# Patient Record
Sex: MALE | Race: WHITE | NOT HISPANIC OR LATINO | Employment: FULL TIME | URBAN - METROPOLITAN AREA
[De-identification: names, ages, dates, MRNs, and addresses within clinical notes are randomized per-mention and may not be internally consistent; named-entity substitution may affect disease eponyms.]

---

## 2020-06-10 ENCOUNTER — TELEPHONE (OUTPATIENT)
Dept: URGENT CARE | Facility: CLINIC | Age: 30
End: 2020-06-10

## 2020-06-10 ENCOUNTER — OFFICE VISIT (OUTPATIENT)
Dept: URGENT CARE | Facility: CLINIC | Age: 30
End: 2020-06-10
Payer: OTHER GOVERNMENT

## 2020-06-10 DIAGNOSIS — Z03.818 ENCOUNTER FOR OBSERVATION FOR SUSPECTED EXPOSURE TO OTHER BIOLOGICAL AGENTS RULED OUT: ICD-10-CM

## 2020-06-10 LAB — SARS-COV-2 IGG SERPLBLD QL IA.RAPID: NEGATIVE

## 2020-06-10 PROCEDURE — 86769 SARS-COV-2 COVID-19 ANTIBODY: CPT

## 2020-06-10 PROCEDURE — U0003 INFECTIOUS AGENT DETECTION BY NUCLEIC ACID (DNA OR RNA); SEVERE ACUTE RESPIRATORY SYNDROME CORONAVIRUS 2 (SARS-COV-2) (CORONAVIRUS DISEASE [COVID-19]), AMPLIFIED PROBE TECHNIQUE, MAKING USE OF HIGH THROUGHPUT TECHNOLOGIES AS DESCRIBED BY CMS-2020-01-R: HCPCS

## 2020-06-10 NOTE — PROGRESS NOTES
Patient was exposed at work.  He is here to be tested.  Asymptomatic.  Counseled patient and answered questions in regards to COVID-19 testing and diagnosis.

## 2020-06-10 NOTE — TELEPHONE ENCOUNTER
Pt was informed of his negative results, verbalized understanding, and had no further questions.  ----- Message from Danial Conti PA-C sent at 6/10/2020  2:54 PM CDT -----  COVID-19 negative. Please follow up with PCP as needed.

## 2020-06-11 LAB — SARS-COV-2 RNA RESP QL NAA+PROBE: NOT DETECTED

## 2020-07-06 ENCOUNTER — OCCUPATIONAL HEALTH (OUTPATIENT)
Dept: URGENT CARE | Facility: CLINIC | Age: 30
End: 2020-07-06
Payer: OTHER GOVERNMENT

## 2020-07-06 VITALS
HEIGHT: 71 IN | BODY MASS INDEX: 25.2 KG/M2 | OXYGEN SATURATION: 98 % | WEIGHT: 180 LBS | HEART RATE: 73 BPM | TEMPERATURE: 99 F

## 2020-07-06 DIAGNOSIS — Z03.818 ENCOUNTER FOR OBSERVATION FOR SUSPECTED EXPOSURE TO OTHER BIOLOGICAL AGENTS RULED OUT: ICD-10-CM

## 2020-07-06 PROCEDURE — U0003 INFECTIOUS AGENT DETECTION BY NUCLEIC ACID (DNA OR RNA); SEVERE ACUTE RESPIRATORY SYNDROME CORONAVIRUS 2 (SARS-COV-2) (CORONAVIRUS DISEASE [COVID-19]), AMPLIFIED PROBE TECHNIQUE, MAKING USE OF HIGH THROUGHPUT TECHNOLOGIES AS DESCRIBED BY CMS-2020-01-R: HCPCS

## 2020-07-06 NOTE — PROGRESS NOTES
Subjective:       Patient ID: Johnny Heller is a 29 y.o. male.    Chief Complaint: No chief complaint on file.    Pt had a headace , fatigue and nausea early this morning. His employer is  Requesting his to get tested for covid.      Constitution: Negative for chills, fatigue and fever.   HENT: Negative for congestion and sore throat.    Neck: Negative for painful lymph nodes.   Cardiovascular: Negative for chest pain and leg swelling.   Eyes: Negative for double vision and blurred vision.   Respiratory: Negative for cough and shortness of breath.    Gastrointestinal: Negative for nausea, vomiting and diarrhea.   Genitourinary: Negative for dysuria, frequency and urgency.   Musculoskeletal: Negative for joint pain, joint swelling, muscle cramps and muscle ache.   Skin: Negative for color change, pale and rash.   Allergic/Immunologic: Negative for seasonal allergies.   Neurological: Negative for dizziness, history of vertigo, light-headedness, passing out and headaches.   Hematologic/Lymphatic: Negative for swollen lymph nodes, easy bruising/bleeding and history of blood clots. Does not bruise/bleed easily.   Psychiatric/Behavioral: Negative for nervous/anxious, sleep disturbance and depression. The patient is not nervous/anxious.         Objective:      Physical Exam    Assessment:       No diagnosis found.    Plan:                   No follow-ups on file.

## 2020-07-09 ENCOUNTER — TELEPHONE (OUTPATIENT)
Dept: URGENT CARE | Facility: CLINIC | Age: 30
End: 2020-07-09

## 2020-07-09 LAB — SARS-COV-2 RNA RESP QL NAA+PROBE: NOT DETECTED

## 2020-10-10 ENCOUNTER — HOSPITAL ENCOUNTER (EMERGENCY)
Facility: HOSPITAL | Age: 30
Discharge: HOME | End: 2020-10-10
Attending: EMERGENCY MEDICINE
Payer: OTHER GOVERNMENT

## 2020-10-10 VITALS
HEART RATE: 91 BPM | SYSTOLIC BLOOD PRESSURE: 129 MMHG | BODY MASS INDEX: 26.6 KG/M2 | RESPIRATION RATE: 18 BRPM | HEIGHT: 71 IN | DIASTOLIC BLOOD PRESSURE: 91 MMHG | TEMPERATURE: 100 F | WEIGHT: 190 LBS | OXYGEN SATURATION: 97 %

## 2020-10-10 DIAGNOSIS — R05.9 COUGH: ICD-10-CM

## 2020-10-10 DIAGNOSIS — R07.89 CHEST TIGHTNESS: ICD-10-CM

## 2020-10-10 DIAGNOSIS — J06.9 VIRAL URI WITH COUGH: Primary | ICD-10-CM

## 2020-10-10 LAB
ALBUMIN SERPL BCP-MCNC: 5 G/DL (ref 3.5–5.2)
ALP SERPL-CCNC: 76 U/L (ref 38–126)
ALT SERPL W/O P-5'-P-CCNC: 46 U/L (ref 10–44)
ANION GAP SERPL CALC-SCNC: 9 MMOL/L (ref 8–16)
AST SERPL-CCNC: 37 U/L (ref 15–46)
BASOPHILS # BLD AUTO: 0.03 K/UL (ref 0–0.2)
BASOPHILS NFR BLD: 0.3 % (ref 0–1.9)
BILIRUB SERPL-MCNC: 0.6 MG/DL (ref 0.1–1)
BUN SERPL-MCNC: 9 MG/DL (ref 2–20)
CALCIUM SERPL-MCNC: 10.1 MG/DL (ref 8.7–10.5)
CHLORIDE SERPL-SCNC: 102 MMOL/L (ref 95–110)
CO2 SERPL-SCNC: 31 MMOL/L (ref 23–29)
CREAT SERPL-MCNC: 0.83 MG/DL (ref 0.5–1.4)
DIFFERENTIAL METHOD: ABNORMAL
EOSINOPHIL # BLD AUTO: 0 K/UL (ref 0–0.5)
EOSINOPHIL NFR BLD: 0.3 % (ref 0–8)
ERYTHROCYTE [DISTWIDTH] IN BLOOD BY AUTOMATED COUNT: 13.2 % (ref 11.5–14.5)
EST. GFR  (AFRICAN AMERICAN): >60 ML/MIN/1.73 M^2
EST. GFR  (NON AFRICAN AMERICAN): >60 ML/MIN/1.73 M^2
GLUCOSE SERPL-MCNC: 97 MG/DL (ref 70–110)
GROUP A STREP, MOLECULAR: NEGATIVE
HCT VFR BLD AUTO: 51.9 % (ref 40–54)
HGB BLD-MCNC: 18.1 G/DL (ref 14–18)
IMM GRANULOCYTES # BLD AUTO: 0.03 K/UL (ref 0–0.04)
IMM GRANULOCYTES NFR BLD AUTO: 0.3 % (ref 0–0.5)
INFLUENZA A, MOLECULAR: NEGATIVE
INFLUENZA B, MOLECULAR: NEGATIVE
LYMPHOCYTES # BLD AUTO: 0.8 K/UL (ref 1–4.8)
LYMPHOCYTES NFR BLD: 8.1 % (ref 18–48)
MCH RBC QN AUTO: 33 PG (ref 27–31)
MCHC RBC AUTO-ENTMCNC: 34.9 G/DL (ref 32–36)
MCV RBC AUTO: 95 FL (ref 82–98)
MONOCYTES # BLD AUTO: 0.9 K/UL (ref 0.3–1)
MONOCYTES NFR BLD: 9 % (ref 4–15)
NEUTROPHILS # BLD AUTO: 8.2 K/UL (ref 1.8–7.7)
NEUTROPHILS NFR BLD: 82 % (ref 38–73)
NRBC BLD-RTO: 0 /100 WBC
NT-PROBNP: 25 PG/ML (ref 5–450)
PLATELET # BLD AUTO: 189 K/UL (ref 150–350)
PMV BLD AUTO: 9.9 FL (ref 9.2–12.9)
POTASSIUM SERPL-SCNC: 4.6 MMOL/L (ref 3.5–5.1)
PROT SERPL-MCNC: 8.6 G/DL (ref 6–8.4)
RBC # BLD AUTO: 5.48 M/UL (ref 4.6–6.2)
SARS-COV-2 RDRP RESP QL NAA+PROBE: NEGATIVE
SODIUM SERPL-SCNC: 142 MMOL/L (ref 136–145)
SPECIMEN SOURCE: NORMAL
TROPONIN I SERPL DL<=0.01 NG/ML-MCNC: <0.012 NG/ML (ref 0.01–0.03)
WBC # BLD AUTO: 10 K/UL (ref 3.9–12.7)

## 2020-10-10 PROCEDURE — 84484 ASSAY OF TROPONIN QUANT: CPT | Mod: ER

## 2020-10-10 PROCEDURE — 85025 COMPLETE CBC W/AUTO DIFF WBC: CPT | Mod: ER

## 2020-10-10 PROCEDURE — 93010 EKG 12-LEAD: ICD-10-PCS | Mod: ,,, | Performed by: INTERNAL MEDICINE

## 2020-10-10 PROCEDURE — 83880 ASSAY OF NATRIURETIC PEPTIDE: CPT | Mod: ER

## 2020-10-10 PROCEDURE — U0002 COVID-19 LAB TEST NON-CDC: HCPCS | Mod: ER

## 2020-10-10 PROCEDURE — 99285 EMERGENCY DEPT VISIT HI MDM: CPT | Mod: 25,ER

## 2020-10-10 PROCEDURE — 93010 ELECTROCARDIOGRAM REPORT: CPT | Mod: ,,, | Performed by: INTERNAL MEDICINE

## 2020-10-10 PROCEDURE — 87651 STREP A DNA AMP PROBE: CPT | Mod: ER

## 2020-10-10 PROCEDURE — 80053 COMPREHEN METABOLIC PANEL: CPT | Mod: ER

## 2020-10-10 PROCEDURE — 87502 INFLUENZA DNA AMP PROBE: CPT | Mod: ER

## 2020-10-10 PROCEDURE — 93005 ELECTROCARDIOGRAM TRACING: CPT | Mod: ER

## 2020-10-10 RX ORDER — ESCITALOPRAM OXALATE 5 MG/1
5 TABLET ORAL DAILY
COMMUNITY

## 2020-10-10 RX ORDER — BENZONATATE 100 MG/1
100 CAPSULE ORAL 3 TIMES DAILY PRN
Qty: 10 CAPSULE | Refills: 0 | Status: SHIPPED | OUTPATIENT
Start: 2020-10-10 | End: 2020-10-20

## 2020-10-10 NOTE — ED PROVIDER NOTES
Encounter Date: 10/10/2020       History     Chief Complaint   Patient presents with    Cough     c/o cough and congestion for several days. Reports headache that started today. States chest burns with coughing.     Nasal Congestion     29-year-old male presents to the emergency department for evaluation of 4 day history of cough, nasal congestion, chest pain and intermittent palpitations.  He reports that the symptoms began gradually 4 days ago and have been intermittent since onset.  He denies any fever, headache, dizziness, shortness of breath, abdominal pain, nausea, vomiting, flank pain or dysuria.  He denies any known sick contacts.  He does report that the intermittent chest pain and palpitations only happens with coughing.  He reports that the cough is intermittently productive with white discharge.  He does report mild sore throat.  He reports that he went to an urgent care and they informed him to come here secondary to abnormal heart rate.  No treatment was attempted prior to arrival.  He denies any leg swelling, recent surgery or recent travel.        Review of patient's allergies indicates:  No Known Allergies  History reviewed. No pertinent past medical history.  History reviewed. No pertinent surgical history.  History reviewed. No pertinent family history.  Social History     Tobacco Use    Smoking status: Current Every Day Smoker     Packs/day: 0.25     Types: Cigarettes    Smokeless tobacco: Never Used   Substance Use Topics    Alcohol use: Yes     Comment: socially    Drug use: Never     Review of Systems   Constitutional: Negative for activity change, appetite change and fever.   HENT: Positive for congestion and sore throat. Negative for ear discharge, ear pain, rhinorrhea, sinus pressure, trouble swallowing and voice change.    Eyes: Negative for photophobia and visual disturbance.   Respiratory: Positive for cough. Negative for chest tightness, shortness of breath and wheezing.     Cardiovascular: Positive for chest pain and palpitations. Negative for leg swelling.   Gastrointestinal: Negative for abdominal pain, constipation, diarrhea, nausea and vomiting.   Genitourinary: Negative for decreased urine volume, dysuria, flank pain and hematuria.   Musculoskeletal: Negative for back pain.   Skin: Negative for rash.   Neurological: Negative for dizziness, syncope, weakness, light-headedness, numbness and headaches.   Hematological: Does not bruise/bleed easily.       Physical Exam     Initial Vitals [10/10/20 1446]   BP Pulse Resp Temp SpO2   (!) 156/85 93 18 99.5 °F (37.5 °C) 96 %      MAP       --         Physical Exam    Nursing note and vitals reviewed.  Constitutional: He appears well-developed and well-nourished. He is not diaphoretic. No distress.   HENT:   Head: Normocephalic and atraumatic.   Right Ear: External ear normal.   Left Ear: External ear normal.   Mouth/Throat: Oropharynx is clear and moist. No oropharyngeal exudate.   Eyes: Conjunctivae and EOM are normal. Pupils are equal, round, and reactive to light.   Neck: Normal range of motion. Neck supple.   Cardiovascular: Normal rate, regular rhythm and normal heart sounds.   Pulmonary/Chest: Breath sounds normal. No respiratory distress. He has no wheezes. He has no rhonchi. He has no rales. He exhibits no tenderness.   Abdominal: Soft. Bowel sounds are normal. He exhibits no distension. There is no abdominal tenderness. There is no guarding.   Lymphadenopathy:     He has no cervical adenopathy.   Neurological: He is alert and oriented to person, place, and time.   Skin: Skin is warm and dry.   Psychiatric: He has a normal mood and affect.         ED Course   Procedures  Labs Reviewed   CBC W/ AUTO DIFFERENTIAL - Abnormal; Notable for the following components:       Result Value    Hemoglobin 18.1 (*)     Mean Corpuscular Hemoglobin 33.0 (*)     Gran # (ANC) 8.2 (*)     Lymph # 0.8 (*)     Gran% 82.0 (*)     Lymph% 8.1 (*)      All other components within normal limits   COMPREHENSIVE METABOLIC PANEL - Abnormal; Notable for the following components:    CO2 31 (*)     Total Protein 8.6 (*)     ALT 46 (*)     All other components within normal limits   GROUP A STREP, MOLECULAR   INFLUENZA A & B BY MOLECULAR   TROPONIN I   NT-PRO NATRIURETIC PEPTIDE   SARS-COV-2 RNA AMPLIFICATION, QUAL     EKG Readings: (Independently Interpreted)   Initial Reading: No STEMI. Rhythm: Sinus rhythm with premature atrial complexes. Heart Rate: 96.       Imaging Results          X-Ray Chest AP Portable (Final result)  Result time 10/10/20 15:55:23    Final result by Bear Marx MD (10/10/20 15:55:23)                 Impression:      1.  Negative for acute process involving the chest.    2.  Incidental findings as noted above.      Electronically signed by: Bear Marx MD  Date:    10/10/2020  Time:    15:55             Narrative:    EXAMINATION:  XR CHEST AP PORTABLE    CLINICAL HISTORY:  Cough    COMPARISON:  No comparison studies are available.    FINDINGS:  The study is lordotic in position and rotated to the right.  The lungs are clear. The cardiac silhouette size is normal. The trachea is midline and the mediastinal width is normal. Negative for focal infiltrate, effusion or pneumothorax. Pulmonary vasculature is normal. Negative for osseous abnormalities. Tortuous aorta.  Marginal spondylosis with convex-left curvature.                                 Medical Decision Making:   Initial Assessment:   29-year-old male presents to the emergency department for evaluation of cough, intermittent chest pain, intermittent palpitations, and nasal congestion.  Physical exam reveals a nontoxic-appearing male in no acute distress.  Patient is afebrile vital signs within normal limits.  Neurological exam reveals an alert and oriented patient.  TMs reveal no erythema.  Posterior pharynx reveals no erythema, edema or tonsillar exudate.  Neck is supple, no meningeal  signs noted.  Lungs clear to auscultation bilaterally.  No respiratory distress or accessory muscle use noted.  Abdominal exam reveals soft abdomen, nontender palpation.  No peripheral edema noted.  Differential Diagnosis:   Chest x-ray ordered to assess possible pneumonia or consolidation  Influenza  COVID-19  Streptococcal pharyngitis  Viral URI  ACS  CHF  Dehydration  ED Management:  EKG reveals no acute ST changes.  Rapid COVID negative.  Group a strep negative.  Influenza negative.  Chest x-ray report reveals no acute findings.  Incidental finding of tortuous aorta noted.  These findings were discussed at length with the patient verbalizes understanding and agreement course of treatment.  Patient verbalized understanding and agreement course of treatment.  Patient denies any current chest pain.  Instructed the patient to follow up with his primary care provider for re-evaluation and to return to the emergency department immediately for any new or worsening symptoms.  I discussed this patient at length with Dr. Calderon who is in agreement course of treatment.                   ED Course as of Oct 10 1646   Sat Oct 10, 2020   1505 EKG shows normal sinus rhythm rate of 96 beats per minute with no ST elevation MI.  Normal QTC    [MB]      ED Course User Index  [MB] Jefferson Calderon MD            Clinical Impression:       ICD-10-CM ICD-9-CM   1. Viral URI with cough  J06.9 465.9   2. Chest tightness  R07.89 786.59   3. Cough  R05 786.2                          ED Disposition Condition    Discharge Stable        ED Prescriptions     None        Follow-up Information    None                                      Isabel Tsang PA-C  10/10/20 0735

## 2020-10-10 NOTE — DISCHARGE INSTRUCTIONS
Your chest x-ray did not reveal any evidence of pneumonia or consolidation.  Your influenza test, COVID-19 test and strep test were all negative.  This is likely an upper respiratory infection of viral etiology.  Symptomatic treatment advised including plenty of clear fluid.  You are instructed to follow-up with your primary care provider for re-evaluation and to return to the emergency department immediately for any new or worsening symptoms.

## 2022-06-02 ENCOUNTER — HOSPITAL ENCOUNTER (EMERGENCY)
Facility: HOSPITAL | Age: 32
Discharge: HOME | End: 2022-06-02
Attending: EMERGENCY MEDICINE

## 2022-06-02 VITALS
OXYGEN SATURATION: 97 % | SYSTOLIC BLOOD PRESSURE: 168 MMHG | BODY MASS INDEX: 25.9 KG/M2 | WEIGHT: 185 LBS | HEART RATE: 69 BPM | TEMPERATURE: 98 F | HEIGHT: 71 IN | RESPIRATION RATE: 16 BRPM | DIASTOLIC BLOOD PRESSURE: 105 MMHG

## 2022-06-02 DIAGNOSIS — S01.511A LIP LACERATION, INITIAL ENCOUNTER: Primary | ICD-10-CM

## 2022-06-02 LAB
HCV AB SERPL QL IA: NEGATIVE
HIV 1+2 AB+HIV1 P24 AG SERPL QL IA: NEGATIVE

## 2022-06-02 PROCEDURE — 90715 TDAP VACCINE 7 YRS/> IM: CPT | Performed by: STUDENT IN AN ORGANIZED HEALTH CARE EDUCATION/TRAINING PROGRAM

## 2022-06-02 PROCEDURE — 99284 EMERGENCY DEPT VISIT MOD MDM: CPT | Mod: 25,,, | Performed by: EMERGENCY MEDICINE

## 2022-06-02 PROCEDURE — 86803 HEPATITIS C AB TEST: CPT | Performed by: EMERGENCY MEDICINE

## 2022-06-02 PROCEDURE — 99284 EMERGENCY DEPT VISIT MOD MDM: CPT | Mod: 25

## 2022-06-02 PROCEDURE — 90471 IMMUNIZATION ADMIN: CPT | Performed by: STUDENT IN AN ORGANIZED HEALTH CARE EDUCATION/TRAINING PROGRAM

## 2022-06-02 PROCEDURE — 99284 PR EMERGENCY DEPT VISIT,LEVEL IV: ICD-10-PCS | Mod: 25,,, | Performed by: EMERGENCY MEDICINE

## 2022-06-02 PROCEDURE — 12013 PR RESUPERF WND FACE 2.6-5 CM: ICD-10-PCS | Mod: ,,, | Performed by: EMERGENCY MEDICINE

## 2022-06-02 PROCEDURE — 12013 RPR F/E/E/N/L/M 2.6-5.0 CM: CPT

## 2022-06-02 PROCEDURE — 63600175 PHARM REV CODE 636 W HCPCS: Performed by: STUDENT IN AN ORGANIZED HEALTH CARE EDUCATION/TRAINING PROGRAM

## 2022-06-02 PROCEDURE — 87389 HIV-1 AG W/HIV-1&-2 AB AG IA: CPT | Performed by: EMERGENCY MEDICINE

## 2022-06-02 PROCEDURE — 12013 RPR F/E/E/N/L/M 2.6-5.0 CM: CPT | Mod: ,,, | Performed by: EMERGENCY MEDICINE

## 2022-06-02 PROCEDURE — 25000003 PHARM REV CODE 250: Performed by: STUDENT IN AN ORGANIZED HEALTH CARE EDUCATION/TRAINING PROGRAM

## 2022-06-02 RX ORDER — LIDOCAINE HYDROCHLORIDE 10 MG/ML
10 INJECTION INFILTRATION; PERINEURAL
Status: COMPLETED | OUTPATIENT
Start: 2022-06-02 | End: 2022-06-02

## 2022-06-02 RX ADMIN — TETANUS TOXOID, REDUCED DIPHTHERIA TOXOID AND ACELLULAR PERTUSSIS VACCINE, ADSORBED 0.5 ML: 5; 2.5; 8; 8; 2.5 SUSPENSION INTRAMUSCULAR at 08:06

## 2022-06-02 RX ADMIN — LIDOCAINE HYDROCHLORIDE 10 ML: 10 INJECTION, SOLUTION INFILTRATION; PERINEURAL at 08:06

## 2022-06-02 NOTE — DISCHARGE INSTRUCTIONS
You have been evaluated in the Emergency Department today for a lip laceration. Your laceration was repaired by dissolvable sutures.    Please follow up with your primary care physician within 3 days for a wound check.    Go to the Emergency Department if you experience worsening symptoms, failure to improve, or have any questions, concerns, or new or concerning symptoms such as chest pain, shortness of breath, fever 100.4 ° F or greater not controlled by Tylenol or Ibuprofen, vomiting, fainting, dizziness, infection, weakness, numbness, facial droop, or slurred speech. Take all of your medications as prescribed.    If you do not have a primary care doctor you should call this hospital or visit its website in order to obtain followup with a primary care doctor near you.     For the lowest local prices and coupons for your prescriptions, download the application Mobile [employer] for your smartphone, or go to www.[email or web address].

## 2022-06-02 NOTE — ED PROVIDER NOTES
Encounter Date: 6/2/2022       History     Chief Complaint   Patient presents with    Laceration     Reports hit in the face by a pipe at work, accidentally. Laceration noted to right lip. UTD on tetanus     HPI     31 year old male with no pertinent past medical history presents after he was hit in the face with a pipe at work causing a 5 cm superficial laceration to the right upper lip. Patient denies any trauma to the rest of his face or head. Patient denies LOC. Patient does not know when his last tdap was. Patient has no other complaints. Patient denies  headaches, dizziness, visual changes, numbness or tingling, and, focal neural deficits.    Review of patient's allergies indicates:  No Known Allergies  History reviewed. No pertinent past medical history.  History reviewed. No pertinent surgical history.  History reviewed. No pertinent family history.  Social History     Tobacco Use    Smoking status: Never Smoker    Smokeless tobacco: Never Used   Substance Use Topics    Alcohol use: Yes     Comment: socially    Drug use: Never     Review of Systems   HENT: Negative for dental problem, nosebleeds, sinus pain and trouble swallowing.    Musculoskeletal: Negative for back pain and neck stiffness.   Skin: Positive for wound.   Neurological: Negative for dizziness, syncope, light-headedness, numbness and headaches.   Psychiatric/Behavioral: Negative for confusion.       Physical Exam     Initial Vitals [06/02/22 0800]   BP Pulse Resp Temp SpO2   (!) 168/105 69 16 98.1 °F (36.7 °C) 97 %      MAP       --         Physical Exam    Nursing note and vitals reviewed.  Constitutional: He appears well-developed and well-nourished. He is not diaphoretic. No distress.   HENT:   Head: Normocephalic. Head is with laceration.       Right Ear: External ear normal.   Left Ear: External ear normal.   Mouth/Throat: Oropharynx is clear and moist.   Eyes: Conjunctivae and EOM are normal. Pupils are equal, round, and reactive to  light.   Neck: Neck supple.   Normal range of motion.  Cardiovascular: Normal rate, regular rhythm, normal heart sounds and intact distal pulses.   Pulmonary/Chest: Breath sounds normal.   Musculoskeletal:      Cervical back: Normal range of motion and neck supple.     Skin: Skin is warm and dry. Capillary refill takes less than 2 seconds. Laceration noted.   Psychiatric: He has a normal mood and affect. His behavior is normal. Thought content normal.         ED Course   Lac Repair    Date/Time: 6/2/2022 9:41 AM  Performed by: Arsh Faust MD  Authorized by: Garcia Ch MD     Consent:     Consent obtained:  Verbal    Consent given by:  Patient    Risks, benefits, and alternatives were discussed: yes      Risks discussed:  Infection, need for additional repair, nerve damage, pain, poor cosmetic result, poor wound healing and vascular damage    Alternatives discussed:  No treatment  Universal protocol:     Procedure explained and questions answered to patient or proxy's satisfaction: yes      Relevant documents present and verified: yes      Patient identity confirmed:  Verbally with patient, hospital-assigned identification number and arm band  Anesthesia:     Anesthesia method:  Nerve block    Block location:  Infraorbital nerve block    Block needle gauge:  27 G    Block anesthetic:  Lidocaine 1% w/o epi    Block injection procedure:  Anatomic landmarks identified, introduced needle, incremental injection, anatomic landmarks palpated and negative aspiration for blood    Block outcome:  Anesthesia achieved  Laceration details:     Location:  Lip    Lip location:  Upper exterior lip    Length (cm):  5    Depth (mm):  10  Pre-procedure details:     Preparation:  Patient was prepped and draped in usual sterile fashion  Treatment:     Area cleansed with:  Chlorhexidine    Amount of cleaning:  Standard    Irrigation solution:  Sterile saline    Irrigation volume:  500 cc  Skin repair:     Repair method:  Sutures     Suture size:  5-0    Wound skin closure material used: vicryl.    Number of sutures:  8  Approximation:     Approximation:  Close    Vermilion border well-aligned: yes    Repair type:     Repair type:  Simple  Post-procedure details:     Dressing:  Open (no dressing)    Procedure completion:  Tolerated      Labs Reviewed   HIV 1 / 2 ANTIBODY   HEPATITIS C ANTIBODY          Imaging Results    None          Medications   LIDOcaine HCL 10 mg/ml (1%) injection 10 mL (10 mLs Infiltration Given by Other 6/2/22 0856)   Tdap (BOOSTRIX) vaccine injection 0.5 mL (0.5 mLs Intramuscular Given 6/2/22 0856)     Medical Decision Making:   ED Management:  31 year old male with no pertinent past medical history presents after he was hit in the face with a pipe at work causing a 5 cm superficial laceration to the right upper lip. This was a work accident when they were moving the pipe. No LOC. Low suspicion for facial fractures, ICH or traumatic SAH, C-spine injury. Doubt other extracranial causes of injury. Patient's laceration was repaired as documented above. No foreign bodies during irrigation and wound exploration. No indication for antibiotics. Patient was given Tdap. The Patient's repeat physical exam benign. The patient has been given strict return precautions. The patient agrees to follow up outpatient with his primary care provider for wound check. Patient instructed on taking over the counter ibuprofen or tylenol for pain. Patient discharged in stable condition.    Arsh Faust D.O.  [facility], PGY-3  6/2/2022 9:51 AM                Attending Attestation:   Physician Attestation Statement for Resident:  As the supervising MD   Physician Attestation Statement: I have personally seen and examined this patient.   I agree with the above history. -:   As the supervising MD I agree with the above PE.    As the supervising MD I agree with the above treatment, course, plan, and disposition.  I was personally present  during the critical portions of the procedure(s) performed by the resident and was immediately available in the ED to provide services and assistance as needed during the entire procedure.                         Clinical Impression:   Final diagnoses:  [S01.511A] Lip laceration, initial encounter (Primary)          ED Disposition Condition    Discharge Stable        ED Prescriptions     None        Follow-up Information     Follow up With Specialties Details Why Contact Info    Your primary care provider  Schedule an appointment as soon as possible for a visit in 3 days For wound re-check     [facility] Emergency Dept Emergency Medicine Go to  As needed, If symptoms worsen [address] [facility] [address]  [phone]           Arsh Faust MD  Resident  06/02/22 0945       Garcia Ch MD  06/02/22 6103

## 2022-06-02 NOTE — ED NOTES
Patient identifiers verified and correct for Mr Heller  C/C: Laceration to top right lip SEE NN  APPEARANCE: awake and alert in NAD.  SKIN: warm, dry 3.5 cm linear irregular laceration to top lip over boarder  MUSCULOSKELETAL: Patient moving all extremities spontaneously, no obvious swelling or deformities noted. Ambulates independently.  RESPIRATORY: Denies shortness of breath.Respirations unlabored.   CARDIAC: Denies CP, 2+ distal pulses; no peripheral edema  ABDOMEN: S/ND/NT, Denies nausea  : voids spontaneously, denies difficulty  Neurologic: AAO x 4; follows commands equal strength in all extremities; denies numbness/tingling. Denies dizziness  Deneis weakness, Denies LOC

## 2023-02-27 ENCOUNTER — HOSPITAL ENCOUNTER (EMERGENCY)
Facility: HOSPITAL | Age: 33
Discharge: HOME | End: 2023-02-27
Attending: EMERGENCY MEDICINE
Payer: COMMERCIAL

## 2023-02-27 VITALS
HEIGHT: 71 IN | RESPIRATION RATE: 20 BRPM | HEART RATE: 108 BPM | DIASTOLIC BLOOD PRESSURE: 80 MMHG | WEIGHT: 204 LBS | OXYGEN SATURATION: 98 % | BODY MASS INDEX: 28.56 KG/M2 | TEMPERATURE: 99 F | SYSTOLIC BLOOD PRESSURE: 125 MMHG

## 2023-02-27 DIAGNOSIS — R06.2 WHEEZING: Primary | ICD-10-CM

## 2023-02-27 DIAGNOSIS — R05.9 COUGH: ICD-10-CM

## 2023-02-27 LAB
INFLUENZA A, MOLECULAR: NEGATIVE
INFLUENZA B, MOLECULAR: NEGATIVE
SARS-COV-2 RDRP RESP QL NAA+PROBE: NEGATIVE
SPECIMEN SOURCE: NORMAL

## 2023-02-27 PROCEDURE — 25000003 PHARM REV CODE 250: Mod: ER | Performed by: EMERGENCY MEDICINE

## 2023-02-27 PROCEDURE — 96372 THER/PROPH/DIAG INJ SC/IM: CPT | Performed by: EMERGENCY MEDICINE

## 2023-02-27 PROCEDURE — 94640 AIRWAY INHALATION TREATMENT: CPT | Mod: ER

## 2023-02-27 PROCEDURE — 99284 EMERGENCY DEPT VISIT MOD MDM: CPT | Mod: 25,ER

## 2023-02-27 PROCEDURE — 63600175 PHARM REV CODE 636 W HCPCS: Mod: ER | Performed by: EMERGENCY MEDICINE

## 2023-02-27 PROCEDURE — 87502 INFLUENZA DNA AMP PROBE: CPT | Mod: ER | Performed by: EMERGENCY MEDICINE

## 2023-02-27 PROCEDURE — U0002 COVID-19 LAB TEST NON-CDC: HCPCS | Mod: ER | Performed by: EMERGENCY MEDICINE

## 2023-02-27 PROCEDURE — 25000242 PHARM REV CODE 250 ALT 637 W/ HCPCS: Mod: ER | Performed by: EMERGENCY MEDICINE

## 2023-02-27 RX ORDER — PSEUDOEPHEDRINE HCL 30 MG
30 TABLET ORAL EVERY 6 HOURS PRN
Qty: 30 TABLET | Refills: 0 | Status: SHIPPED | OUTPATIENT
Start: 2023-02-27 | End: 2023-03-09

## 2023-02-27 RX ORDER — PROMETHAZINE HYDROCHLORIDE AND CODEINE PHOSPHATE 6.25; 1 MG/5ML; MG/5ML
10 SOLUTION ORAL
Status: DISCONTINUED | OUTPATIENT
Start: 2023-02-27 | End: 2023-02-27

## 2023-02-27 RX ORDER — PROMETHAZINE HYDROCHLORIDE 6.25 MG/5ML
12.5 SYRUP ORAL
Status: COMPLETED | OUTPATIENT
Start: 2023-02-27 | End: 2023-02-27

## 2023-02-27 RX ORDER — PROMETHAZINE HYDROCHLORIDE AND CODEINE PHOSPHATE 6.25; 1 MG/5ML; MG/5ML
10 SOLUTION ORAL EVERY 4 HOURS PRN
Qty: 240 ML | Refills: 0 | Status: SHIPPED | OUTPATIENT
Start: 2023-02-27 | End: 2023-03-09

## 2023-02-27 RX ORDER — ACETAMINOPHEN AND CODEINE PHOSPHATE 300; 30 MG/1; MG/1
1 TABLET ORAL
Status: COMPLETED | OUTPATIENT
Start: 2023-02-27 | End: 2023-02-27

## 2023-02-27 RX ORDER — AZELASTINE 1 MG/ML
1 SPRAY, METERED NASAL 2 TIMES DAILY
Qty: 30 ML | Refills: 1 | Status: SHIPPED | OUTPATIENT
Start: 2023-02-27

## 2023-02-27 RX ORDER — IPRATROPIUM BROMIDE AND ALBUTEROL SULFATE 2.5; .5 MG/3ML; MG/3ML
3 SOLUTION RESPIRATORY (INHALATION)
Status: COMPLETED | OUTPATIENT
Start: 2023-02-27 | End: 2023-02-27

## 2023-02-27 RX ORDER — ALBUTEROL SULFATE 90 UG/1
1-2 AEROSOL, METERED RESPIRATORY (INHALATION) EVERY 4 HOURS PRN
Qty: 18 G | Refills: 0 | Status: SHIPPED | OUTPATIENT
Start: 2023-02-27

## 2023-02-27 RX ORDER — DEXAMETHASONE SODIUM PHOSPHATE 4 MG/ML
8 INJECTION, SOLUTION INTRA-ARTICULAR; INTRALESIONAL; INTRAMUSCULAR; INTRAVENOUS; SOFT TISSUE
Status: COMPLETED | OUTPATIENT
Start: 2023-02-27 | End: 2023-02-27

## 2023-02-27 RX ADMIN — PROMETHAZINE HYDROCHLORIDE 12.5 MG: 6.25 SOLUTION ORAL at 09:02

## 2023-02-27 RX ADMIN — ACETAMINOPHEN AND CODEINE PHOSPHATE 1 TABLET: 300; 30 TABLET ORAL at 09:02

## 2023-02-27 RX ADMIN — DEXAMETHASONE SODIUM PHOSPHATE 8 MG: 4 INJECTION, SOLUTION INTRA-ARTICULAR; INTRALESIONAL; INTRAMUSCULAR; INTRAVENOUS; SOFT TISSUE at 09:02

## 2023-02-27 RX ADMIN — IPRATROPIUM BROMIDE AND ALBUTEROL SULFATE 3 ML: 2.5; .5 SOLUTION RESPIRATORY (INHALATION) at 09:02

## 2023-02-27 NOTE — ED PROVIDER NOTES
"Encounter Date: 2/27/2023       History     Chief Complaint   Patient presents with    Shortness of Breath     Pt C/O incessant cough X 4 hrs.  Pt reports he was exposed to "a chemical years ago" and has had chronic SOB since that time.      HPI  This is a 32 y.o. male who has no past medical history on file.     The patient presents to the Emergency Department with cough since early this morning around 4:00 a.m.  Cough is dry, incessant, associated with wheeze and subjective fever.  Patient also having vomiting, small amount of blood noted.  Patient reports a chronic cough since being exposed to benzene a year or so ago.  Had previously tried albuterol pump right after the exposure with minimal help.      Review of patient's allergies indicates:  No Known Allergies  No past medical history on file.  No past surgical history on file.  No family history on file.  Social History     Tobacco Use    Smoking status: Never    Smokeless tobacco: Never   Substance Use Topics    Alcohol use: Yes     Comment: socially    Drug use: Never     Review of Systems   Constitutional:  Positive for fever.   Respiratory:  Positive for cough and shortness of breath.    Musculoskeletal:  Positive for back pain.     Physical Exam     Initial Vitals [02/27/23 0758]   BP Pulse Resp Temp SpO2   125/80 (!) 129 (!) 24 99.4 °F (37.4 °C) 97 %      MAP       --         Physical Exam    Nursing note and vitals reviewed.  Constitutional: He appears well-developed and well-nourished. He is not diaphoretic. No distress.   HENT:   Head: Normocephalic and atraumatic.   Mouth/Throat: Oropharynx is clear and moist.   Eyes: Conjunctivae are normal.   Cardiovascular:  Regular rhythm.           tachycardic   Pulmonary/Chest: He is in respiratory distress. He has wheezes.   +cough   Musculoskeletal:         General: Normal range of motion.     Neurological: He is alert and oriented to person, place, and time. He has normal strength.   Skin: Skin is warm and " dry. Capillary refill takes less than 2 seconds. No rash noted. No pallor.   Psychiatric: He has a normal mood and affect.       ED Course   Procedures  Labs Reviewed   INFLUENZA A & B BY MOLECULAR   SARS-COV-2 RNA AMPLIFICATION, QUAL    Narrative:     Is the patient symptomatic?->Yes          Imaging Results              X-Ray Chest PA And Lateral (Final result)  Result time 02/27/23 09:26:21      Final result by ALEX Goldberg Sr., MD (02/27/23 09:26:21)                   Impression:      Normal study.      Electronically signed by: Edin Goldberg MD  Date:    02/27/2023  Time:    09:26               Narrative:    EXAMINATION:  XR CHEST PA AND LATERAL    CLINICAL HISTORY:  Cough, unspecified    COMPARISON:  10/10/2020    FINDINGS:  The size and contour of the heart are normal. The lungs are clear. There is no pneumothorax or pleural effusion.                                    X-Rays:   Independently Interpreted Readings:   Chest X-Ray: Normal heart size.  No infiltrates.  No acute abnormalities.   Medications   dexAMETHasone injection 8 mg (has no administration in time range)   albuterol-ipratropium 2.5 mg-0.5 mg/3 mL nebulizer solution 3 mL (3 mLs Nebulization Given 2/27/23 0902)   promethazine 6.25 mg/5 mL syrup 12.5 mg (12.5 mg Oral Given 2/27/23 0926)   acetaminophen-codeine 300-30mg per tablet 1 tablet (1 tablet Oral Given 2/27/23 0926)     Medical Decision Making:   Initial Assessment:   This is an emergent evaluation of a 32 y.o.male patient with presentation of cough x4 hours, associated wheeze.  History of benzene exposure inhalation injury about a year ago.     Initial differentials include but are not limited to:  Restrictive lung disease, pneumonitis, COVID, flu, pneumonia, reactive airway disease.     Plan:  DuoNeb, chest x-ray, COVID, flu, Phenergan    Independently Interpreted Test(s):   I have ordered and independently interpreted X-rays - see prior notes.  Clinical Tests:   Lab Tests: Ordered  and Reviewed  Radiological Study: Ordered and Reviewed  ED Management:    Patient will be discharged home with Phenergan/codeine, pseudoephedrine, albuterol, azelastine.  Pulmonology ambulatory referral placed by me.  May need pulmonary function tests.  Despite care provided in the ED and discharge plan, patient may worsen and require repeat evaluation.  Return precautions discussed.  Discussed all the above with patient and family at bedside, expressed understanding.                        Clinical Impression:   Final diagnoses:  [R05.9] Cough  [R06.2] Wheezing (Primary)        ED Disposition Condition    Discharge Stable          ED Prescriptions       Medication Sig Dispense Start Date End Date Auth. Provider    promethazine-codeine 6.25-10 mg/5 ml (PHENERGAN WITH CODEINE) 6.25-10 mg/5 mL syrup Take 10 mLs by mouth every 4 (four) hours as needed for Cough. 240 mL 2/27/2023 3/9/2023 Eleazar Salcedo MD    pseudoephedrine (SUDAFED) 30 MG tablet Take 1 tablet (30 mg total) by mouth every 6 (six) hours as needed for Congestion. 30 tablet 2/27/2023 3/9/2023 Eleazar Salcedo MD    azelastine (ASTELIN) 137 mcg (0.1 %) nasal spray 1 spray (137 mcg total) by Nasal route 2 (two) times daily. 30 mL 2/27/2023 -- Eleazar Salcedo MD    albuterol (PROVENTIL/VENTOLIN HFA) 90 mcg/actuation inhaler Inhale 1-2 puffs into the lungs every 4 (four) hours as needed for Wheezing or Shortness of Breath. Rescue 18 g 2/27/2023 -- Eleazar Salcedo MD          Follow-up Information       Follow up With Specialties Details Why Contact Info    Vesta Rahman MD Pulmonary Disease Schedule an appointment as soon as possible for a visit   [address]  [address]  [place] [address] [phone]               Eleazar Salcedo MD  02/27/23 2093

## 2023-03-07 ENCOUNTER — TELEPHONE (OUTPATIENT)
Dept: PULMONOLOGY | Facility: CLINIC | Age: 33
End: 2023-03-07
Payer: COMMERCIAL

## 2023-03-07 DIAGNOSIS — R05.9 COUGH, UNSPECIFIED TYPE: Primary | ICD-10-CM

## 2023-04-24 NOTE — PROGRESS NOTES
Subjective:      Patient ID: Johnny Heller is a 32 y.o. male.    Chief Complaint: No chief complaint on file.    Pt is a 33 yo CM no pmh who presents for evaluation after chemical exposure. Was seen in ED 2/27/23 for dry cough, wheeze and subjective fever. Was treated with duoneb, dexamethasone. Since then patient has been doing well and has not needed to use his albuterol inhaler. Pt states that he was emptying tank of benzene when it sprayed into his face while he tried to close it for about a minute. He bled from his mouth and nose and had shortness of breath for some time after it. He was started on and ICS by company pulmonologist for some time. Unsure if he felt better using it.     Pt has had multiple infrequent episodes of either waking up with difficulty breathing or acute onset of feeling like he cannot inhale. Gets short of breath with taking stairs and even short walks or sitting still. Has some episodes of deep breathing when he is stressed. Previous long distance runner, but has severe chest burning with running half a mile now.     Notes that he has PND and is constantly clearing his throat and coughs up mucous frequently.     Chronic fatigue over the past year. Has been sleeping 7-8 hours a night, but still has trouble staying awake in the afternoons. Falls alseep if he is sitting despite not trying to. Has had to increase his caffeine intake to compensate.     Has noted increased episode of vomiting after eating with notable hematochezia, sometimes with clots.     Smoking hx: never smoker  Inhaler use: none  PRN inhaler use: has not used albuterol  Hx of lung disease: none  Family hx of lung disease: none    Review of Systems   Constitutional:  Positive for fatigue. Negative for weight loss and weight gain.   HENT:  Positive for postnasal drip. Negative for rhinorrhea and congestion.    Respiratory:  Positive for cough, sputum production, shortness of breath and dyspnea on extertion. Negative  for wheezing and use of rescue inhaler.    Cardiovascular:  Negative for chest pain, palpitations and leg swelling.   Musculoskeletal:  Negative for arthralgias.   Gastrointestinal:  Positive for vomiting. Negative for nausea and acid reflux.   Neurological:  Positive for light-headedness.   Psychiatric/Behavioral:  Negative for confusion and sleep disturbance. The patient is not nervous/anxious.      Objective:     Physical Exam   Constitutional: He is oriented to person, place, and time. He appears well-developed and well-nourished. He is not obese.   HENT:   Head: Normocephalic.   Cardiovascular: Normal rate, regular rhythm and normal heart sounds. Exam reveals no gallop and no friction rub.   No murmur heard.  Pulmonary/Chest: Normal expansion, hyperinflation, symmetric chest wall expansion and effort normal. No stridor. He has no wheezes. He has no rhonchi. He has no rales.   Musculoskeletal:         General: No edema.   Neurological: He is alert and oriented to person, place, and time. Gait normal.   Skin: No cyanosis. Nails show no clubbing.   Psychiatric: He has a normal mood and affect. His behavior is normal. Judgment and thought content normal.   Vitals reviewed.    Personal Diagnostic Review    Chest x-ray: 23  No acute cardiopulmonary process.     Pulmonary function tests:   23  FEV1: 4.56L  (99.6 % predicted),   FVC:  5.37L (95.7 % predicted),   FEV1/FVC:  85,   T.83L (93.1 % predicted),   DLCO: 28.48 (79.7 % predicted)    No flowsheet data found.     Assessment:     1. Acute respiratory distress    2. Chronic cough    3. Hematemesis with nausea    4. Fatigue, unspecified type         Outpatient Encounter Medications as of 2023   Medication Sig Dispense Refill    albuterol (PROVENTIL/VENTOLIN HFA) 90 mcg/actuation inhaler Inhale 1-2 puffs into the lungs every 4 (four) hours as needed for Wheezing or Shortness of Breath. Rescue 18 g 0    azelastine (ASTELIN) 137 mcg (0.1 %) nasal  spray 1 spray (137 mcg total) by Nasal route 2 (two) times daily. 30 mL 1    escitalopram oxalate (LEXAPRO) 5 MG Tab Take 5 mg by mouth once daily.       No facility-administered encounter medications on file as of 4/26/2023.     Orders Placed This Encounter   Procedures    CBC auto differential     Standing Status:   Future     Number of Occurrences:   1     Standing Expiration Date:   6/24/2024    Ambulatory referral/consult to Gastroenterology     Standing Status:   Future     Standing Expiration Date:   5/26/2024     Referral Priority:   Urgent     Referral Type:   Consultation     Referral Reason:   Specialty Services Required     Requested Specialty:   Gastroenterology     Number of Visits Requested:   1    Ambulatory referral/consult to ENT     Standing Status:   Future     Standing Expiration Date:   5/26/2024     Referral Priority:   Routine     Referral Type:   Consultation     Referral Reason:   Specialty Services Required     Requested Specialty:   Otolaryngology     Number of Visits Requested:   1       Plan:     Acute respiratory distress  Pt with intermittent episodes of acute shortness of breath. Most recent lasted 4 hours and required ED visit treated with nebulizer, dexamethasone, cough medications. Possible bronchospasm, but unlikely this far out from benzene exposure without other signs of lung disease. No history of asthma or reactive airway disease. PFTs without abnormality. Concern for vocal cord dysfunction or laryngospasm.   - Referral for ENT evaluation  - PRN albuterol use    Hematemesis with nausea  Episodes of post prandial emesis with intermittent hematemesis sometimes with clots. Concern for stricture after benzene exposure with possible shilpi pruitt tear vs ulcers.   - referral to GI.     Fatigue  Possibly anemia, although normal DLCO. Pt is having intermittent hematemasis. Pt has hx of snoring, unsure of apnea events. Significant fatigue throughtout the day. If CBC normal will get  home sleep study.     Cough  No evidence of chronic lung process from benzene exposure. Does have PND that is fairly persistent. Improves with azelastine spray. Has not been instructed to do sinus  saline rinses.   - start saline rinse vs joy pot. Educated to use saline solutions or purified water to avoid infections.   - continue use of azelastine BID after sinus rinses    Follow up in 3 months.     Naveen Wayne MD  [facility]

## 2023-04-26 ENCOUNTER — HOSPITAL ENCOUNTER (OUTPATIENT)
Dept: PULMONOLOGY | Facility: CLINIC | Age: 33
Discharge: HOME | End: 2023-04-26
Payer: COMMERCIAL

## 2023-04-26 ENCOUNTER — LAB VISIT (OUTPATIENT)
Dept: LAB | Facility: HOSPITAL | Age: 33
End: 2023-04-26
Attending: INTERNAL MEDICINE
Payer: COMMERCIAL

## 2023-04-26 ENCOUNTER — OFFICE VISIT (OUTPATIENT)
Dept: PULMONOLOGY | Facility: CLINIC | Age: 33
End: 2023-04-26
Payer: COMMERCIAL

## 2023-04-26 ENCOUNTER — PATIENT MESSAGE (OUTPATIENT)
Dept: PULMONOLOGY | Facility: CLINIC | Age: 33
End: 2023-04-26

## 2023-04-26 VITALS
DIASTOLIC BLOOD PRESSURE: 82 MMHG | WEIGHT: 212.06 LBS | BODY MASS INDEX: 29.69 KG/M2 | HEIGHT: 71 IN | OXYGEN SATURATION: 97 % | HEART RATE: 83 BPM | SYSTOLIC BLOOD PRESSURE: 124 MMHG

## 2023-04-26 DIAGNOSIS — K92.0 HEMATEMESIS WITH NAUSEA: ICD-10-CM

## 2023-04-26 DIAGNOSIS — R05.9 COUGH, UNSPECIFIED TYPE: ICD-10-CM

## 2023-04-26 DIAGNOSIS — R05.3 CHRONIC COUGH: ICD-10-CM

## 2023-04-26 DIAGNOSIS — R53.83 FATIGUE, UNSPECIFIED TYPE: ICD-10-CM

## 2023-04-26 DIAGNOSIS — R06.03 ACUTE RESPIRATORY DISTRESS: Primary | ICD-10-CM

## 2023-04-26 PROBLEM — R06.2 WHEEZING: Status: ACTIVE | Noted: 2023-04-26

## 2023-04-26 PROBLEM — R06.2 WHEEZING: Status: RESOLVED | Noted: 2023-04-26 | Resolved: 2023-04-26

## 2023-04-26 LAB
BASOPHILS # BLD AUTO: 0.02 K/UL (ref 0–0.2)
BASOPHILS NFR BLD: 0.4 % (ref 0–1.9)
DIFFERENTIAL METHOD: ABNORMAL
EOSINOPHIL # BLD AUTO: 0.1 K/UL (ref 0–0.5)
EOSINOPHIL NFR BLD: 0.9 % (ref 0–8)
ERYTHROCYTE [DISTWIDTH] IN BLOOD BY AUTOMATED COUNT: 12.5 % (ref 11.5–14.5)
HCT VFR BLD AUTO: 44.5 % (ref 40–54)
HGB BLD-MCNC: 15.2 G/DL (ref 14–18)
IMM GRANULOCYTES # BLD AUTO: 0.01 K/UL (ref 0–0.04)
IMM GRANULOCYTES NFR BLD AUTO: 0.2 % (ref 0–0.5)
LYMPHOCYTES # BLD AUTO: 1.4 K/UL (ref 1–4.8)
LYMPHOCYTES NFR BLD: 25.2 % (ref 18–48)
MCH RBC QN AUTO: 31.2 PG (ref 27–31)
MCHC RBC AUTO-ENTMCNC: 34.2 G/DL (ref 32–36)
MCV RBC AUTO: 91 FL (ref 82–98)
MONOCYTES # BLD AUTO: 0.5 K/UL (ref 0.3–1)
MONOCYTES NFR BLD: 9.5 % (ref 4–15)
NEUTROPHILS # BLD AUTO: 3.6 K/UL (ref 1.8–7.7)
NEUTROPHILS NFR BLD: 63.8 % (ref 38–73)
NRBC BLD-RTO: 0 /100 WBC
PLATELET # BLD AUTO: 210 K/UL (ref 150–450)
PMV BLD AUTO: 10.1 FL (ref 9.2–12.9)
RBC # BLD AUTO: 4.87 M/UL (ref 4.6–6.2)
WBC # BLD AUTO: 5.59 K/UL (ref 3.9–12.7)

## 2023-04-26 PROCEDURE — 3008F BODY MASS INDEX DOCD: CPT | Mod: CPTII,S$GLB,, | Performed by: INTERNAL MEDICINE

## 2023-04-26 PROCEDURE — 1159F MED LIST DOCD IN RCRD: CPT | Mod: CPTII,S$GLB,, | Performed by: INTERNAL MEDICINE

## 2023-04-26 PROCEDURE — 3079F PR MOST RECENT DIASTOLIC BLOOD PRESSURE 80-89 MM HG: ICD-10-PCS | Mod: CPTII,S$GLB,, | Performed by: INTERNAL MEDICINE

## 2023-04-26 PROCEDURE — 99204 OFFICE O/P NEW MOD 45 MIN: CPT | Mod: 25,S$GLB,, | Performed by: INTERNAL MEDICINE

## 2023-04-26 PROCEDURE — 99999 PR PBB SHADOW E&M-EST. PATIENT-LVL IV: ICD-10-PCS | Mod: PBBFAC,,, | Performed by: INTERNAL MEDICINE

## 2023-04-26 PROCEDURE — 99204 PR OFFICE/OUTPT VISIT, NEW, LEVL IV, 45-59 MIN: ICD-10-PCS | Mod: 25,S$GLB,, | Performed by: INTERNAL MEDICINE

## 2023-04-26 PROCEDURE — 3074F PR MOST RECENT SYSTOLIC BLOOD PRESSURE < 130 MM HG: ICD-10-PCS | Mod: CPTII,S$GLB,, | Performed by: INTERNAL MEDICINE

## 2023-04-26 PROCEDURE — 3074F SYST BP LT 130 MM HG: CPT | Mod: CPTII,S$GLB,, | Performed by: INTERNAL MEDICINE

## 2023-04-26 PROCEDURE — 94729 DIFFUSING CAPACITY: CPT | Mod: S$GLB,,, | Performed by: INTERNAL MEDICINE

## 2023-04-26 PROCEDURE — 99999 PR PBB SHADOW E&M-EST. PATIENT-LVL IV: CPT | Mod: PBBFAC,,, | Performed by: INTERNAL MEDICINE

## 2023-04-26 PROCEDURE — 94729 PR C02/MEMBANE DIFFUSE CAPACITY: ICD-10-PCS | Mod: S$GLB,,, | Performed by: INTERNAL MEDICINE

## 2023-04-26 PROCEDURE — 94727 PR PULM FUNCTION TEST BY GAS: ICD-10-PCS | Mod: S$GLB,,, | Performed by: INTERNAL MEDICINE

## 2023-04-26 PROCEDURE — 3079F DIAST BP 80-89 MM HG: CPT | Mod: CPTII,S$GLB,, | Performed by: INTERNAL MEDICINE

## 2023-04-26 PROCEDURE — 85025 COMPLETE CBC W/AUTO DIFF WBC: CPT | Performed by: INTERNAL MEDICINE

## 2023-04-26 PROCEDURE — 94060 PR EVAL OF BRONCHOSPASM: ICD-10-PCS | Mod: S$GLB,,, | Performed by: INTERNAL MEDICINE

## 2023-04-26 PROCEDURE — 36415 COLL VENOUS BLD VENIPUNCTURE: CPT | Performed by: INTERNAL MEDICINE

## 2023-04-26 PROCEDURE — 94727 GAS DIL/WSHOT DETER LNG VOL: CPT | Mod: S$GLB,,, | Performed by: INTERNAL MEDICINE

## 2023-04-26 PROCEDURE — 1159F PR MEDICATION LIST DOCUMENTED IN MEDICAL RECORD: ICD-10-PCS | Mod: CPTII,S$GLB,, | Performed by: INTERNAL MEDICINE

## 2023-04-26 PROCEDURE — 94060 EVALUATION OF WHEEZING: CPT | Mod: S$GLB,,, | Performed by: INTERNAL MEDICINE

## 2023-04-26 PROCEDURE — 3008F PR BODY MASS INDEX (BMI) DOCUMENTED: ICD-10-PCS | Mod: CPTII,S$GLB,, | Performed by: INTERNAL MEDICINE

## 2023-04-26 NOTE — ASSESSMENT & PLAN NOTE
Episodes of post prandial emesis with intermittent hematemesis sometimes with clots. Concern for stricture after benzene exposure with possible shilpi pruitt tear vs ulcers.   - referral to GI.

## 2023-04-26 NOTE — ASSESSMENT & PLAN NOTE
Pt with intermittent episodes of acute shortness of breath. Most recent lasted 4 hours and required ED visit treated with nebulizer, dexamethasone, cough medications. Possible bronchospasm, but unlikely this far out from benzene exposure without other signs of lung disease. No history of asthma or reactive airway disease. PFTs without abnormality. Concern for vocal cord dysfunction or laryngospasm.   - Referral for ENT evaluation  - PRN albuterol use

## 2023-04-26 NOTE — ASSESSMENT & PLAN NOTE
No evidence of chronic lung process from benzene exposure. Does have PND that is fairly persistent. Improves with azelastine spray. Has not been instructed to do sinus  saline rinses.   - start saline rinse vs joy pot. Educated to use saline solutions or purified water to avoid infections.   - continue use of azelastine BID after sinus rinses

## 2023-04-26 NOTE — ASSESSMENT & PLAN NOTE
Possibly anemia, although normal DLCO. Pt is having intermittent hematemasis. Pt has hx of snoring, unsure of apnea events. Significant fatigue throughtout the day. If CBC normal will get home sleep study.

## 2023-04-27 DIAGNOSIS — R29.818 SUSPECTED SLEEP APNEA: Primary | ICD-10-CM

## 2023-05-02 ENCOUNTER — OFFICE VISIT (OUTPATIENT)
Dept: GASTROENTEROLOGY | Facility: CLINIC | Age: 33
End: 2023-05-02
Payer: COMMERCIAL

## 2023-05-02 VITALS — HEIGHT: 71 IN | WEIGHT: 205 LBS | BODY MASS INDEX: 28.7 KG/M2

## 2023-05-02 DIAGNOSIS — K92.0 HEMATEMESIS WITH NAUSEA: ICD-10-CM

## 2023-05-02 PROCEDURE — 99999 PR PBB SHADOW E&M-EST. PATIENT-LVL III: ICD-10-PCS | Mod: PBBFAC,,,

## 2023-05-02 PROCEDURE — 3008F PR BODY MASS INDEX (BMI) DOCUMENTED: ICD-10-PCS | Mod: CPTII,S$GLB,,

## 2023-05-02 PROCEDURE — 1159F MED LIST DOCD IN RCRD: CPT | Mod: CPTII,S$GLB,,

## 2023-05-02 PROCEDURE — 99999 PR PBB SHADOW E&M-EST. PATIENT-LVL III: CPT | Mod: PBBFAC,,,

## 2023-05-02 PROCEDURE — 99204 OFFICE O/P NEW MOD 45 MIN: CPT | Mod: S$GLB,,,

## 2023-05-02 PROCEDURE — 3008F BODY MASS INDEX DOCD: CPT | Mod: CPTII,S$GLB,,

## 2023-05-02 PROCEDURE — 99204 PR OFFICE/OUTPT VISIT, NEW, LEVL IV, 45-59 MIN: ICD-10-PCS | Mod: S$GLB,,,

## 2023-05-02 PROCEDURE — 1159F PR MEDICATION LIST DOCUMENTED IN MEDICAL RECORD: ICD-10-PCS | Mod: CPTII,S$GLB,,

## 2023-05-02 RX ORDER — PANTOPRAZOLE SODIUM 40 MG/1
40 TABLET, DELAYED RELEASE ORAL DAILY
Qty: 30 TABLET | Refills: 3 | Status: SHIPPED | OUTPATIENT
Start: 2023-05-02 | End: 2023-05-09

## 2023-05-02 NOTE — H&P (VIEW-ONLY)
"     GASTROENTEROLOGY CLINIC NOTE    Reason for visit: The encounter diagnosis was Hematemesis with nausea.  Referring provider/PCP: Primary Doctor No    HPI:  Johnny Heller is a 32 y.o. male here today for hematemesis, began about a year ago. He had exposure to benzene at work and symptoms started occurring after that. He vomits about 2-3x/week, he has some associated nausea beforehand. Vomitus contains food and bile, he also notices bright red blood clots. He sees the blood about once per week. He has seen pulmonology and seeing ENT next week due to benzene exposure. He denies NSAID use. He has tried pepcid OTC without relief. No acid relux related symptoms. Unsure if he has seen black stool or not, nothing recently. No abd pain.     Prior Endoscopy:  EGD:  Colon:    (Portions of this note were dictated using voice recognition software and may contain dictation related errors in spelling/grammar/syntax not found on text review)    Review of Systems   Gastrointestinal:  Positive for nausea and vomiting. Negative for abdominal pain.     Past Medical History: has no past medical history on file.    Past Surgical History: has no past surgical history on file.    Home medications:   Current Outpatient Medications on File Prior to Visit   Medication Sig Dispense Refill    albuterol (PROVENTIL/VENTOLIN HFA) 90 mcg/actuation inhaler Inhale 1-2 puffs into the lungs every 4 (four) hours as needed for Wheezing or Shortness of Breath. Rescue 18 g 0    azelastine (ASTELIN) 137 mcg (0.1 %) nasal spray 1 spray (137 mcg total) by Nasal route 2 (two) times daily. 30 mL 1    escitalopram oxalate (LEXAPRO) 5 MG Tab Take 5 mg by mouth once daily.       No current facility-administered medications on file prior to visit.       Vital signs:  Ht 5' 11" (1.803 m)   Wt 93 kg (205 lb)   BMI 28.59 kg/m²     Physical Exam  Constitutional:       Appearance: Normal appearance. He is normal weight.   Abdominal:      General: Abdomen is " flat. There is no distension.      Palpations: Abdomen is soft.      Tenderness: There is no abdominal tenderness.   Neurological:      Mental Status: He is alert.       I have reviewed associated labs, imaging and notes.     Assessment:  1. Hematemesis with nausea    Hematemesis  Started about a year ago after benzene exposure  Vomits about 2-3x week, once a week sees blood clots  No abd pain, no reported melena, nausea before vomiting  Hgb stable  No NSAID use    Plan:  Orders Placed This Encounter    pantoprazole (PROTONIX) 40 MG tablet    Case Request Endoscopy: EGD (ESOPHAGOGASTRODUODENOSCOPY)     Start taking protonix daily    Schedule EGD for further evaluation     Gertrudis Concepcion NP  [facility] [place]

## 2023-05-02 NOTE — PROGRESS NOTES
"     GASTROENTEROLOGY CLINIC NOTE    Reason for visit: The encounter diagnosis was Hematemesis with nausea.  Referring provider/PCP: Primary Doctor No    HPI:  Johnny Heller is a 32 y.o. male here today for hematemesis, began about a year ago. He had exposure to benzene at work and symptoms started occurring after that. He vomits about 2-3x/week, he has some associated nausea beforehand. Vomitus contains food and bile, he also notices bright red blood clots. He sees the blood about once per week. He has seen pulmonology and seeing ENT next week due to benzene exposure. He denies NSAID use. He has tried pepcid OTC without relief. No acid relux related symptoms. Unsure if he has seen black stool or not, nothing recently. No abd pain.     Prior Endoscopy:  EGD:  Colon:    (Portions of this note were dictated using voice recognition software and may contain dictation related errors in spelling/grammar/syntax not found on text review)    Review of Systems   Gastrointestinal:  Positive for nausea and vomiting. Negative for abdominal pain.     Past Medical History: has no past medical history on file.    Past Surgical History: has no past surgical history on file.    Home medications:   Current Outpatient Medications on File Prior to Visit   Medication Sig Dispense Refill    albuterol (PROVENTIL/VENTOLIN HFA) 90 mcg/actuation inhaler Inhale 1-2 puffs into the lungs every 4 (four) hours as needed for Wheezing or Shortness of Breath. Rescue 18 g 0    azelastine (ASTELIN) 137 mcg (0.1 %) nasal spray 1 spray (137 mcg total) by Nasal route 2 (two) times daily. 30 mL 1    escitalopram oxalate (LEXAPRO) 5 MG Tab Take 5 mg by mouth once daily.       No current facility-administered medications on file prior to visit.       Vital signs:  Ht 5' 11" (1.803 m)   Wt 93 kg (205 lb)   BMI 28.59 kg/m²     Physical Exam  Constitutional:       Appearance: Normal appearance. He is normal weight.   Abdominal:      General: Abdomen is " flat. There is no distension.      Palpations: Abdomen is soft.      Tenderness: There is no abdominal tenderness.   Neurological:      Mental Status: He is alert.       I have reviewed associated labs, imaging and notes.     Assessment:  1. Hematemesis with nausea    Hematemesis  Started about a year ago after benzene exposure  Vomits about 2-3x week, once a week sees blood clots  No abd pain, no reported melena, nausea before vomiting  Hgb stable  No NSAID use    Plan:  Orders Placed This Encounter    pantoprazole (PROTONIX) 40 MG tablet    Case Request Endoscopy: EGD (ESOPHAGOGASTRODUODENOSCOPY)     Start taking protonix daily    Schedule EGD for further evaluation     Gertrudis Concepcion NP  [facility] [place]

## 2023-05-02 NOTE — PATIENT INSTRUCTIONS
EGD Instructions    [facility] [address]  Clinic Office [phone]  Endoscopy Lab [phone]    You are scheduled for an EGD with Dr. Bruno  on  5/11/23 at [facility] in [place].    Check in at the Hospital -1st floor, Information desk.   Call [phone] to reschedule.    You cannot have anything to eat or drink after Midnight. You can brush your teeth with a sip of water.     An adult friend/family member must come with you to drive you home.  You cannot drive, take a taxi, Uber/Lyft or bus to leave the Endoscopy Center alone.  If you do not have someone to drive you home, your test will be cancelled.     Please follow the directions of your doctor if you take any pills that thin your blood. If you take these meds: Aggrenox, Brilinta, Effient, Eliquis, Lovenox, Plavix, Pletal, Pradaxa, Ticilid, Xarelto or Coumadin, let the doctor's office know.    DON'T: On the morning of the test do not take insulin or pills for diabetes.     DO: On the morning of the test, do take any pills for blood pressure, heart, anti-rejection and or seizures with a small sip of water. Bring any inhalers with you.    Leave all valuables and jewelry at home. You will be at the hospital for 2-4 hours.    Call the Endoscopy department at [phone] with any questions about your procedure.    Thank you for choosing [facility].

## 2023-05-09 ENCOUNTER — TELEPHONE (OUTPATIENT)
Dept: ENDOSCOPY | Facility: HOSPITAL | Age: 33
End: 2023-05-09
Payer: COMMERCIAL

## 2023-05-09 ENCOUNTER — OFFICE VISIT (OUTPATIENT)
Dept: OTOLARYNGOLOGY | Facility: CLINIC | Age: 33
End: 2023-05-09
Payer: COMMERCIAL

## 2023-05-09 VITALS
TEMPERATURE: 98 F | DIASTOLIC BLOOD PRESSURE: 87 MMHG | WEIGHT: 207.56 LBS | HEART RATE: 75 BPM | BODY MASS INDEX: 28.95 KG/M2 | SYSTOLIC BLOOD PRESSURE: 136 MMHG

## 2023-05-09 DIAGNOSIS — R09.A2 GLOBUS SENSATION: ICD-10-CM

## 2023-05-09 DIAGNOSIS — R05.3 CHRONIC COUGH: ICD-10-CM

## 2023-05-09 DIAGNOSIS — J31.0 CHRONIC RHINITIS: Chronic | ICD-10-CM

## 2023-05-09 DIAGNOSIS — K92.0 HEMATEMESIS WITH NAUSEA: ICD-10-CM

## 2023-05-09 DIAGNOSIS — K21.9 LARYNGOPHARYNGEAL REFLUX (LPR): Primary | Chronic | ICD-10-CM

## 2023-05-09 PROCEDURE — 3075F PR MOST RECENT SYSTOLIC BLOOD PRESS GE 130-139MM HG: ICD-10-PCS | Mod: CPTII,S$GLB,, | Performed by: OTOLARYNGOLOGY

## 2023-05-09 PROCEDURE — 3008F BODY MASS INDEX DOCD: CPT | Mod: CPTII,S$GLB,, | Performed by: OTOLARYNGOLOGY

## 2023-05-09 PROCEDURE — 3079F PR MOST RECENT DIASTOLIC BLOOD PRESSURE 80-89 MM HG: ICD-10-PCS | Mod: CPTII,S$GLB,, | Performed by: OTOLARYNGOLOGY

## 2023-05-09 PROCEDURE — 31575 LARYNGOSCOPY: ICD-10-PCS | Mod: S$GLB,,, | Performed by: OTOLARYNGOLOGY

## 2023-05-09 PROCEDURE — 3008F PR BODY MASS INDEX (BMI) DOCUMENTED: ICD-10-PCS | Mod: CPTII,S$GLB,, | Performed by: OTOLARYNGOLOGY

## 2023-05-09 PROCEDURE — 1160F PR REVIEW ALL MEDS BY PRESCRIBER/CLIN PHARMACIST DOCUMENTED: ICD-10-PCS | Mod: CPTII,S$GLB,, | Performed by: OTOLARYNGOLOGY

## 2023-05-09 PROCEDURE — 1159F MED LIST DOCD IN RCRD: CPT | Mod: CPTII,S$GLB,, | Performed by: OTOLARYNGOLOGY

## 2023-05-09 PROCEDURE — 31575 DIAGNOSTIC LARYNGOSCOPY: CPT | Mod: S$GLB,,, | Performed by: OTOLARYNGOLOGY

## 2023-05-09 PROCEDURE — 1160F RVW MEDS BY RX/DR IN RCRD: CPT | Mod: CPTII,S$GLB,, | Performed by: OTOLARYNGOLOGY

## 2023-05-09 PROCEDURE — 99999 PR PBB SHADOW E&M-EST. PATIENT-LVL III: ICD-10-PCS | Mod: PBBFAC,,, | Performed by: OTOLARYNGOLOGY

## 2023-05-09 PROCEDURE — 99204 PR OFFICE/OUTPT VISIT, NEW, LEVL IV, 45-59 MIN: ICD-10-PCS | Mod: 25,S$GLB,, | Performed by: OTOLARYNGOLOGY

## 2023-05-09 PROCEDURE — 99204 OFFICE O/P NEW MOD 45 MIN: CPT | Mod: 25,S$GLB,, | Performed by: OTOLARYNGOLOGY

## 2023-05-09 PROCEDURE — 1159F PR MEDICATION LIST DOCUMENTED IN MEDICAL RECORD: ICD-10-PCS | Mod: CPTII,S$GLB,, | Performed by: OTOLARYNGOLOGY

## 2023-05-09 PROCEDURE — 99999 PR PBB SHADOW E&M-EST. PATIENT-LVL III: CPT | Mod: PBBFAC,,, | Performed by: OTOLARYNGOLOGY

## 2023-05-09 PROCEDURE — 3075F SYST BP GE 130 - 139MM HG: CPT | Mod: CPTII,S$GLB,, | Performed by: OTOLARYNGOLOGY

## 2023-05-09 PROCEDURE — 3079F DIAST BP 80-89 MM HG: CPT | Mod: CPTII,S$GLB,, | Performed by: OTOLARYNGOLOGY

## 2023-05-09 RX ORDER — FLUTICASONE PROPIONATE 50 MCG
2 SPRAY, SUSPENSION (ML) NASAL DAILY
Qty: 9.9 ML | Refills: 11 | Status: SHIPPED | OUTPATIENT
Start: 2023-05-09

## 2023-05-09 RX ORDER — PANTOPRAZOLE SODIUM 40 MG/1
40 TABLET, DELAYED RELEASE ORAL 2 TIMES DAILY
Qty: 60 TABLET | Refills: 2 | Status: SHIPPED | OUTPATIENT
Start: 2023-05-09 | End: 2023-10-03

## 2023-05-09 RX ORDER — LEVOCETIRIZINE DIHYDROCHLORIDE 5 MG/1
5 TABLET, FILM COATED ORAL NIGHTLY
Qty: 30 TABLET | Refills: 11 | Status: SHIPPED | OUTPATIENT
Start: 2023-05-09 | End: 2023-10-03 | Stop reason: SDUPTHER

## 2023-05-09 NOTE — TELEPHONE ENCOUNTER
Spoke with patient about arrival time @ 1045.   EGD    NPO status reviewed: Patient must have nothing to eat after midnight.      Medications: Do not take Insulin or oral diabetic medications the day of the procedure.  Take as prescribed: heart, seizure and blood pressure medication in the morning with a sip of water (less than an ounce).  Take any breathing medications and bring inhalers to hospital with you Leave all valuables and jewelry at home.     Wear comfortable clothes to procedure to change into hospital gown You cannot drive for 24 hours after your procedure because you will receive sedation for your procedure to make you comfortable.  A ride must be provided at discharge.

## 2023-05-09 NOTE — PROCEDURES
Laryngoscopy    Date/Time: 5/9/2023 8:40 AM  Performed by: Nancy Arce MD  Authorized by: Nancy Arce MD     Consent Done?:  Yes (Verbal)  Anesthesia:     Local anesthetic:  Lidocaine 2% and John-Synephrine 1/2%  Laryngoscopy:     Areas examined:  Nasal cavities, nasopharynx, oropharynx, hypopharynx, larynx and vocal cords  Nose External:      No external nasal deformity  Nose Intranasal:      Mucosa no polyps     Mucosa ulcers not present     No mucosa lesions present     Septum gross deformity (deviation to the left)     Enlarged turbinates  Nasopharynx:      No mucosa lesions     Adenoids not present     Posterior choanae patent     Eustachian tube patent  Larynx/hypopharynx:      No epiglottis lesions     No epiglottis edema     No AE folds lesions     No vocal cord polyps     Equal and normal bilateral     No hypopharynx lesions     No piriform sinus pooling     No piriform sinus lesions     Post cricoid edema (moderate)     Post cricoid erythema (moderate)

## 2023-05-09 NOTE — PROGRESS NOTES
Chief Complaint   Patient presents with    Other     States has a lot of mucus in throat   .     HPI:Johnny Heller is a 32 y.o. male who has been referred by Dr. Naveen Wayne for chronic cough. He reports history of chemical exposure 10/21. He has had multiple symptoms.  Was seen in ED 2/27/23 for dry cough, wheeze and subjective fever. Was treated with duoneb, dexamethasone. He notes that he also had nausea/vomitting at that time which he attributes to cough. He has intermittent feeling of throat swelling and feeling that it is closing up.  He was started on and ICS by company pulmonologist for some time. Unsure if he felt better using it. He has had PFTs which were normal.      Chronic fatigue over the past year. He does not think that he snores. Has been sleeping 7-8 hours a night, but still has trouble staying awake in the afternoons. Falls alseep if he is sitting despite not trying to. Has had to increase his caffeine intake to compensate. He has sleep study scheduled by pulmonology.      He denies history of hoarseness. Sometimes has hoarseness associated with yelling.  His voice is not progressively worsening over this time. There are pitch breaks or cracks. There is not vocal fatigue. He denies dysphagia, odynophagia, throat pain, and otalgia.  There is no hemoptysis or hematemesis. He is breathing well.     He admits to throat clearing and cough. He denies heartburn and reflux. Has noted increased episode of vomiting after eating with notable hematochezia, sometimes with clots.  He has seen GI with plans for EGD. He is on Protonix 40mg PO daily.   Notes that he has PND and is constantly clearing his throat and coughs up mucous frequently.         No past medical history on file.  Social History     Socioeconomic History    Marital status: Single   Tobacco Use    Smoking status: Never    Smokeless tobacco: Never   Substance and Sexual Activity    Alcohol use: Yes     Comment: socially    Drug  use: Never     No past surgical history on file.  No family history on file.        Review of Systems  General: negative for chills, fever or weight loss  Psychological: negative for mood changes or depression  Ophthalmic: negative for blurry vision, photophobia or eye pain  ENT: see HPI  Respiratory: no cough, shortness of breath, or wheezing  Cardiovascular: no chest pain or dyspnea on exertion  Gastrointestinal: no abdominal pain, change in bowel habits, or black/ bloody stools  Musculoskeletal: negative for gait disturbance or muscular weakness  Neurological: no syncope or seizures; no ataxia  Dermatological: negative for puritis,  rash and jaundice  Hematologic/lymphatic: no easy bruising, no new lumps or bumps      Physical Exam:    Vitals:    05/09/23 0847   BP: 136/87   Pulse: 75   Temp: 98.3 °F (36.8 °C)       Constitutional: Well appearing / communicating without difficutly.  NAD.  Eyes: EOM I Bilaterally  Head/Face: Normocephalic.  Negative paranasal sinus pressure/tenderness.  Salivary glands WNL.  House Brackmann I Bilaterally.    Right Ear: Auricle normal appearance. External Auditory Canal within normal limits no lesions or masses,TM w/o masses/lesions/perforations. TM mobility noted.   Left Ear: Auricle normal appearance. External Auditory Canal within normal limits no lesions or masses,TM w/o masses/lesions/perforations. TM mobility noted.  Nose: +nasal septal deviation to the left.  Inferior Turbinates 3+ bilaterally. No septal perforation. No masses/lesions. External nasal skin appears normal without masses/lesions.  Oral Cavity: Gingiva/lips within normal limits.  Dentition/gingiva healthy appearing. Mucus membranes moist. Floor of mouth soft, no masses palpated. Oral Tongue mobile. Hard Palate appears normal.    Oropharynx: Base of tongue appears normal. No masses/lesions noted. Tonsillar fossa/pharyngeal wall without lesions. Posterior oropharynx WNL.  Soft palate without masses. Midline uvula.    Neck/Lymphatic: No LAD I-VI bilaterally.  No thyromegaly.  No masses noted on exam.    Mirror laryngoscopy/nasopharyngoscopy: Active gag reflex.  Unable to perform.          See separate procedure note for FFL.    Diagnostic studies reviewed:   PFT 4/26/2023: normal    Assessment:    ICD-10-CM ICD-9-CM    1. Laryngopharyngeal reflux (LPR)  K21.9 478.79       2. Chronic rhinitis  J31.0 472.0       3. Chronic cough  R05.3 786.2 Ambulatory referral/consult to ENT      4. Hematemesis with nausea  K92.0 578.0 pantoprazole (PROTONIX) 40 MG tablet     787.02       5. Globus sensation  R09.89 784.99 Laryngoscopy        The primary encounter diagnosis was Laryngopharyngeal reflux (LPR). Diagnoses of Chronic rhinitis, Chronic cough, Hematemesis with nausea, and Globus sensation were also pertinent to this visit.      Plan:  Orders Placed This Encounter   Procedures    Laryngoscopy     Start nasal saline rinses BID  Start Flonase 2 sprays per nostril daily  Continue astelin 1 spray per nostril BID  Xyzal 5 mg PO daily     I recommended that the patient start taking  Protonix 40mg PO BID* and provided a prescription.     I also recommended that the patient refrain from eating within 3 hours of going to bed, to elevate the head of bed very subtly and optimize the impact of gravity on the potential reflux, and to avoid alcohol, caffeine, tobacco, tomato sauce, spicy foods, fried food, and chocolate.      Follow up in 6-8 weeks to reassess progress with treatment regimen.     Nancy Arce MD

## 2023-05-10 ENCOUNTER — TELEPHONE (OUTPATIENT)
Dept: SLEEP MEDICINE | Facility: OTHER | Age: 33
End: 2023-05-10
Payer: COMMERCIAL

## 2023-05-11 ENCOUNTER — HOSPITAL ENCOUNTER (OUTPATIENT)
Facility: HOSPITAL | Age: 33
Discharge: HOME | End: 2023-05-11
Attending: INTERNAL MEDICINE | Admitting: INTERNAL MEDICINE
Payer: COMMERCIAL

## 2023-05-11 ENCOUNTER — ANESTHESIA (OUTPATIENT)
Dept: ENDOSCOPY | Facility: HOSPITAL | Age: 33
End: 2023-05-11
Payer: COMMERCIAL

## 2023-05-11 ENCOUNTER — ANESTHESIA EVENT (OUTPATIENT)
Dept: ENDOSCOPY | Facility: HOSPITAL | Age: 33
End: 2023-05-11
Payer: COMMERCIAL

## 2023-05-11 VITALS
WEIGHT: 205 LBS | RESPIRATION RATE: 17 BRPM | OXYGEN SATURATION: 100 % | TEMPERATURE: 98 F | HEART RATE: 70 BPM | HEIGHT: 71 IN | SYSTOLIC BLOOD PRESSURE: 130 MMHG | BODY MASS INDEX: 28.7 KG/M2 | DIASTOLIC BLOOD PRESSURE: 81 MMHG

## 2023-05-11 DIAGNOSIS — K92.2 UGIB (UPPER GASTROINTESTINAL BLEED): ICD-10-CM

## 2023-05-11 PROCEDURE — D9220A PRA ANESTHESIA: Mod: ANES,,, | Performed by: STUDENT IN AN ORGANIZED HEALTH CARE EDUCATION/TRAINING PROGRAM

## 2023-05-11 PROCEDURE — 25000003 PHARM REV CODE 250: Performed by: NURSE ANESTHETIST, CERTIFIED REGISTERED

## 2023-05-11 PROCEDURE — 43239 PR EGD, FLEX, W/BIOPSY, SGL/MULTI: ICD-10-PCS | Mod: ,,, | Performed by: INTERNAL MEDICINE

## 2023-05-11 PROCEDURE — D9220A PRA ANESTHESIA: ICD-10-PCS | Mod: CRNA,,, | Performed by: NURSE ANESTHETIST, CERTIFIED REGISTERED

## 2023-05-11 PROCEDURE — D9220A PRA ANESTHESIA: ICD-10-PCS | Mod: ANES,,, | Performed by: STUDENT IN AN ORGANIZED HEALTH CARE EDUCATION/TRAINING PROGRAM

## 2023-05-11 PROCEDURE — 43239 EGD BIOPSY SINGLE/MULTIPLE: CPT | Mod: ,,, | Performed by: INTERNAL MEDICINE

## 2023-05-11 PROCEDURE — D9220A PRA ANESTHESIA: Mod: CRNA,,, | Performed by: NURSE ANESTHETIST, CERTIFIED REGISTERED

## 2023-05-11 PROCEDURE — 63600175 PHARM REV CODE 636 W HCPCS: Performed by: NURSE ANESTHETIST, CERTIFIED REGISTERED

## 2023-05-11 PROCEDURE — 37000009 HC ANESTHESIA EA ADD 15 MINS: Performed by: INTERNAL MEDICINE

## 2023-05-11 PROCEDURE — 88305 TISSUE EXAM BY PATHOLOGIST: CPT | Mod: 26,,, | Performed by: PATHOLOGY

## 2023-05-11 PROCEDURE — 37000008 HC ANESTHESIA 1ST 15 MINUTES: Performed by: INTERNAL MEDICINE

## 2023-05-11 PROCEDURE — 27201012 HC FORCEPS, HOT/COLD, DISP: Performed by: INTERNAL MEDICINE

## 2023-05-11 PROCEDURE — 25000003 PHARM REV CODE 250: Performed by: INTERNAL MEDICINE

## 2023-05-11 PROCEDURE — 88305 TISSUE EXAM BY PATHOLOGIST: ICD-10-PCS | Mod: 26,,, | Performed by: PATHOLOGY

## 2023-05-11 PROCEDURE — 88305 TISSUE EXAM BY PATHOLOGIST: CPT | Mod: 59 | Performed by: PATHOLOGY

## 2023-05-11 PROCEDURE — 43239 EGD BIOPSY SINGLE/MULTIPLE: CPT | Performed by: INTERNAL MEDICINE

## 2023-05-11 RX ORDER — PROPOFOL 10 MG/ML
VIAL (ML) INTRAVENOUS
Status: DISCONTINUED | OUTPATIENT
Start: 2023-05-11 | End: 2023-05-11

## 2023-05-11 RX ORDER — SODIUM CHLORIDE 9 MG/ML
INJECTION, SOLUTION INTRAVENOUS CONTINUOUS
Status: DISCONTINUED | OUTPATIENT
Start: 2023-05-11 | End: 2023-05-11 | Stop reason: HOSPADM

## 2023-05-11 RX ORDER — LIDOCAINE HYDROCHLORIDE 20 MG/ML
INJECTION, SOLUTION EPIDURAL; INFILTRATION; INTRACAUDAL; PERINEURAL
Status: DISCONTINUED | OUTPATIENT
Start: 2023-05-11 | End: 2023-05-11

## 2023-05-11 RX ORDER — PROPOFOL 10 MG/ML
VIAL (ML) INTRAVENOUS CONTINUOUS PRN
Status: DISCONTINUED | OUTPATIENT
Start: 2023-05-11 | End: 2023-05-11

## 2023-05-11 RX ADMIN — LIDOCAINE HYDROCHLORIDE 80 MG: 20 INJECTION, SOLUTION EPIDURAL; INFILTRATION; INTRACAUDAL; PERINEURAL at 11:05

## 2023-05-11 RX ADMIN — SODIUM CHLORIDE: 0.9 INJECTION, SOLUTION INTRAVENOUS at 11:05

## 2023-05-11 RX ADMIN — PROPOFOL 150 MG: 10 INJECTION, EMULSION INTRAVENOUS at 11:05

## 2023-05-11 RX ADMIN — SODIUM CHLORIDE: 0.9 INJECTION, SOLUTION INTRAVENOUS at 10:05

## 2023-05-11 RX ADMIN — TOPICAL ANESTHETIC 1 EACH: 200 SPRAY DENTAL; PERIODONTAL at 11:05

## 2023-05-11 RX ADMIN — PROPOFOL 150 MCG/KG/MIN: 10 INJECTION, EMULSION INTRAVENOUS at 11:05

## 2023-05-11 NOTE — TRANSFER OF CARE
"Anesthesia Transfer of Care Note    Patient: Johnny Heller    Procedure(s) Performed: Procedure(s) (LRB):  EGD (ESOPHAGOGASTRODUODENOSCOPY) (N/A)    Patient location: GI    Anesthesia Type: general    Transport from OR: Transported from OR on room air with adequate spontaneous ventilation    Post pain: adequate analgesia    Post assessment: no apparent anesthetic complications    Post vital signs: stable    Level of consciousness: awake    Nausea/Vomiting: no nausea/vomiting    Complications: none    Transfer of care protocol was followed      Last vitals:   Visit Vitals  BP (!) 156/99 (Patient Position: Lying)   Pulse (!) 56   Temp (!) -17.3 °C (0.9 °F)   Resp 17   Ht 5' 11" (1.803 m)   Wt 93 kg (205 lb)   SpO2 99%   BMI 28.59 kg/m²     "

## 2023-05-11 NOTE — ANESTHESIA PREPROCEDURE EVALUATION
[facility]  Anesthesia Pre-Operative Evaluation         Patient Name: Johnny Heller  YOB: 1990  MRN: [id number]    SUBJECTIVE:     05/11/2023    Procedure(s) (LRB):  EGD (ESOPHAGOGASTRODUODENOSCOPY) (N/A)    Johnny Heller is a 32 y.o. male here for Procedure(s) (LRB):  EGD (ESOPHAGOGASTRODUODENOSCOPY) (N/A)    Drips:    sodium chloride 0.9% 20 mL/hr at 05/11/23 1035       Patient Active Problem List   Diagnosis    Cough    Hematemesis with nausea    Fatigue    Acute respiratory distress       Review of patient's allergies indicates:  No Known Allergies    No current facility-administered medications on file prior to encounter.     Current Outpatient Medications on File Prior to Encounter   Medication Sig Dispense Refill    albuterol (PROVENTIL/VENTOLIN HFA) 90 mcg/actuation inhaler Inhale 1-2 puffs into the lungs every 4 (four) hours as needed for Wheezing or Shortness of Breath. Rescue 18 g 0    azelastine (ASTELIN) 137 mcg (0.1 %) nasal spray 1 spray (137 mcg total) by Nasal route 2 (two) times daily. 30 mL 1    escitalopram oxalate (LEXAPRO) 5 MG Tab Take 5 mg by mouth once daily.         History reviewed. No pertinent surgical history.    Social History     Socioeconomic History    Marital status: Single   Tobacco Use    Smoking status: Never    Smokeless tobacco: Never   Substance and Sexual Activity    Alcohol use: Yes     Comment: socially    Drug use: Never         OBJECTIVE:     Vital Signs Range (Last 24H):  Temp:  [36.6 °C (97.9 °F)-36.9 °C (98.4 °F)] 36.8 °C (98.2 °F)  Pulse:  [56-70] (P) 61  Resp:  [15-17] (P) 16  SpO2:  [99 %-100 %] (P) 100 %  BP: (130-156)/(81-99) (P) 125/82    Significant Labs:  Lab Results   Component Value Date    WBC 5.59 04/26/2023    HGB 15.2 04/26/2023    HCT 44.5 04/26/2023     04/26/2023    ALT 46 (H) 10/10/2020    AST 37 10/10/2020     10/10/2020    K 4.6 10/10/2020     10/10/2020    CREATININE  0.83 10/10/2020    BUN 9 10/10/2020    CO2 31 (H) 10/10/2020       Diagnostic Studies:    EKG:   Results for orders placed or performed during the hospital encounter of 10/10/20   EKG 12-lead    Collection Time: 10/10/20  2:57 PM    Narrative    Test Reason : R07.89,    Vent. Rate : 096 BPM     Atrial Rate : 096 BPM     P-R Int : 126 ms          QRS Dur : 078 ms      QT Int : 310 ms       P-R-T Axes : 070 064 034 degrees     QTc Int : 391 ms    Sinus rhythm with Premature atrial complexes  Otherwise normal ECG  No previous ECGs available  Confirmed by Yvan Mccoy MD ([id number]) on 10/12/2020 9:49:49 AM    Referred By: [email or web address]   SELF           Confirmed By:Yvan Mccoy MD       Pre-op Assessment    I have reviewed the Patient Summary Reports.     I have reviewed the Nursing Notes. I have reviewed the NPO Status.   I have reviewed the Medications.     Review of Systems  Anesthesia Hx:  No problems with previous Anesthesia  History of prior surgery of interest to airway management or planning:  Denies Personal Hx of Anesthesia complications.   Social:  Social Alcohol Use, Non-Smoker    Hematology/Oncology:  Hematology Normal   Oncology Normal     EENT/Dental:EENT/Dental Normal   Cardiovascular:  Cardiovascular Normal Exercise tolerance: good  Denies Hypertension.  Denies MI.      Pulmonary:   Denies COPD.  Denies Asthma. Sleep study scheduled   No recent albuterol use    Renal/:  Renal/ Normal  Denies Chronic Renal Disease.     Hepatic/GI:   GERD Denies Liver Disease.    Neurological:  Neurology Normal Denies TIA.  Denies CVA. Denies Seizures.    Endocrine:  Endocrine Normal Denies Diabetes. Denies Hypothyroidism.  Denies Hyperthyroidism.        Physical Exam  General: Well nourished, Cooperative, Alert and Oriented    Airway:  Mallampati: II / I  Mouth Opening: Normal  TM Distance: > 6 cm          Anesthesia Plan  Type of Anesthesia, risks & benefits discussed:    Anesthesia Type: Gen Natural Airway  Intra-op  Monitoring Plan: Standard ASA Monitors  Post Op Pain Control Plan: multimodal analgesia and IV/PO Opioids PRN  Induction:  IV  Informed Consent: Informed consent signed with the Patient and all parties understand the risks and agree with anesthesia plan.  All questions answered.   ASA Score: 2  Day of Surgery Review of History & Physical: H&P Update referred to the surgeon/provider.    Ready For Surgery From Anesthesia Perspective.     .

## 2023-05-11 NOTE — ANESTHESIA POSTPROCEDURE EVALUATION
Anesthesia Post Evaluation    Patient: Johnny Heller    Procedure(s) Performed: Procedure(s) (LRB):  EGD (ESOPHAGOGASTRODUODENOSCOPY) (N/A)    Final Anesthesia Type: general      Patient location during evaluation: PACU  Patient participation: Yes- Able to Participate  Level of consciousness: awake  Post-procedure vital signs: reviewed and stable  Pain management: adequate  Airway patency: patent    PONV status at discharge: No PONV  Anesthetic complications: no      Cardiovascular status: blood pressure returned to baseline  Respiratory status: unassisted  Hydration status: euvolemic  Follow-up not needed.          Vitals Value Taken Time   /81 05/11/23 1208   Temp 36.8 °C (98.2 °F) 05/11/23 1221   Pulse 70 05/11/23 1208   Resp 17 05/11/23 1208   SpO2 100 % 05/11/23 1208         No case tracking events are documented in the log.      Pain/Willie Score: Willie Score: 8 (5/11/2023 12:08 PM)

## 2023-05-11 NOTE — PROVATION PATIENT INSTRUCTIONS
Discharge Summary/Instructions after an Endoscopic Procedure  Patient Name: Johnny Heller  Patient MRN: [id number]  Patient YOB: 1990  Thursday, May 11, 2023  Tyron Bruno MD  Dear patient,  As a result of recent federal legislation (The Federal Cures Act), you may   receive lab or pathology results from your procedure in your [facility]   account before your physician is able to contact you. Your physician or   their representative will relay the results to you with their   recommendations at their soonest availability.  Thank you,  Your health is very important to us during the Covid Crisis. Following your   procedure today, you will receive a daily text for 2 weeks asking about   signs or symptoms of Covid 19.  Please respond to this text when you   receive it so we can follow up and keep you as safe as possible.   RESTRICTIONS:  During your procedure today, you received medications for sedation.  These   medications may affect your judgment, balance and coordination.  Therefore,   for 24 hours, you have the following restrictions:   - DO NOT drive a car, operate machinery, make legal/financial decisions,   sign important papers or drink alcohol.    ACTIVITY:  Today: no heavy lifting, straining or running due to procedural   sedation/anesthesia.  The following day: return to full activity including work.  DIET:  Eat and drink normally unless instructed otherwise.     TREATMENT FOR COMMON SIDE EFFECTS:  - Mild abdominal pain, nausea, belching, bloating or excessive gas:  rest,   eat lightly and use a heating pad.  - Sore Throat: treat with throat lozenges and/or gargle with warm salt   water.  - Because air was used during the procedure, expelling large amounts of air   from your rectum or belching is normal.  - If a bowel prep was taken, you may not have a bowel movement for 1-3 days.    This is normal.  SYMPTOMS TO WATCH FOR AND REPORT TO YOUR PHYSICIAN:  1. Abdominal pain or bloating, other  than gas cramps.  2. Chest pain.  3. Back pain.  4. Signs of infection such as: chills or fever occurring within 24 hours   after the procedure.  5. Rectal bleeding, which would show as bright red, maroon, or black stools.   (A tablespoon of blood from the rectum is not serious, especially if   hemorrhoids are present.)  6. Vomiting.  7. Weakness or dizziness.  GO DIRECTLY TO THE NEAREST EMERGENCY ROOM IF YOU HAVE ANY OF THE FOLLOWING:      Difficulty breathing              Chills and/or fever over 101 F   Persistent vomiting and/or vomiting blood   Severe abdominal pain   Severe chest pain   Black, tarry stools   Bleeding- more than one tablespoon   Any other symptom or condition that you feel may need urgent attention  Your doctor recommends these additional instructions:  If any biopsies were taken, your doctors clinic will contact you in 1 to 2   weeks with any results.  - Discharge patient to home.   - Resume previous diet.   - Continue present medications.   - Await pathology results.   - Return to nurse practitioner PRN.  For questions, problems or results please call your physician - Tyron Bruno MD.  EMERGENCY PHONE NUMBER: [phone],  LAB RESULTS: [phone]  IF A COMPLICATION OR EMERGENCY SITUATION ARISES AND YOU ARE UNABLE TO REACH   YOUR PHYSICIAN - GO DIRECTLY TO THE EMERGENCY ROOM.  Tyron Bruon MD  5/11/2023 12:03:47 PM  This report has been verified and signed electronically.  Dear patient,  As a result of recent federal legislation (The Federal Cures Act), you may   receive lab or pathology results from your procedure in your [facility]   account before your physician is able to contact you. Your physician or   their representative will relay the results to you with their   recommendations at their soonest availability.  Thank you,  [employer]ATION

## 2023-05-15 ENCOUNTER — HOSPITAL ENCOUNTER (OUTPATIENT)
Dept: SLEEP MEDICINE | Facility: OTHER | Age: 33
Discharge: HOME | End: 2023-05-15
Attending: INTERNAL MEDICINE
Payer: COMMERCIAL

## 2023-05-15 DIAGNOSIS — R29.818 SUSPECTED SLEEP APNEA: ICD-10-CM

## 2023-05-15 PROCEDURE — 95800 SLP STDY UNATTENDED: CPT

## 2023-05-16 PROBLEM — R29.818 SUSPECTED SLEEP APNEA: Status: ACTIVE | Noted: 2023-05-16

## 2023-05-17 PROCEDURE — 95806 PR SLEEP STUDY, UNATTENDED, SIMUL RECORD HR/O2 SAT/RESP FLOW/RESP EFFT: ICD-10-PCS | Mod: 26,,, | Performed by: INTERNAL MEDICINE

## 2023-05-17 PROCEDURE — 95806 SLEEP STUDY UNATT&RESP EFFT: CPT | Mod: 26,,, | Performed by: INTERNAL MEDICINE

## 2023-05-17 NOTE — PROCEDURES
HST completed and interpreted.  Report is available under media tab.   Please contact Dr. Adama Dominguez or place referral to Sleep Medicine if a formal consult might be of benefit.

## 2023-05-19 LAB
FINAL PATHOLOGIC DIAGNOSIS: NORMAL
GROSS: NORMAL
Lab: NORMAL

## 2023-05-25 ENCOUNTER — TELEPHONE (OUTPATIENT)
Dept: GASTROENTEROLOGY | Facility: CLINIC | Age: 33
End: 2023-05-25
Payer: COMMERCIAL

## 2023-05-25 NOTE — PROGRESS NOTES
Path reviewed. No cause of dysphagia noted. F/u as needed with Gertrudis if symptoms persist. Could consider manometry

## 2023-05-25 NOTE — TELEPHONE ENCOUNTER
----- Message from Tyron Bruno MD sent at 5/25/2023  8:54 AM CDT -----  Path reviewed. No cause of dysphagia noted. F/u as needed with Gertrudis if symptoms persist. Could consider manometry

## 2023-05-26 ENCOUNTER — PATIENT MESSAGE (OUTPATIENT)
Dept: GASTROENTEROLOGY | Facility: CLINIC | Age: 33
End: 2023-05-26
Payer: COMMERCIAL

## 2023-07-31 PROBLEM — K92.0 HEMATEMESIS WITH NAUSEA: Status: RESOLVED | Noted: 2023-04-26 | Resolved: 2023-07-31

## 2023-08-27 ENCOUNTER — HOSPITAL ENCOUNTER (EMERGENCY)
Facility: HOSPITAL | Age: 33
Discharge: HOME | End: 2023-08-27
Attending: EMERGENCY MEDICINE
Payer: COMMERCIAL

## 2023-08-27 VITALS
SYSTOLIC BLOOD PRESSURE: 140 MMHG | HEART RATE: 86 BPM | WEIGHT: 205 LBS | HEIGHT: 71 IN | BODY MASS INDEX: 28.7 KG/M2 | RESPIRATION RATE: 19 BRPM | DIASTOLIC BLOOD PRESSURE: 95 MMHG | TEMPERATURE: 99 F | OXYGEN SATURATION: 97 %

## 2023-08-27 DIAGNOSIS — S61.219A LACERATION OF FINGER OF LEFT HAND WITHOUT FOREIGN BODY WITHOUT DAMAGE TO NAIL, UNSPECIFIED FINGER, INITIAL ENCOUNTER: Primary | ICD-10-CM

## 2023-08-27 PROCEDURE — 12001 RPR S/N/AX/GEN/TRNK 2.5CM/<: CPT | Mod: ER

## 2023-08-27 PROCEDURE — 99282 EMERGENCY DEPT VISIT SF MDM: CPT | Mod: 25,ER

## 2023-08-28 NOTE — ED PROVIDER NOTES
"Encounter Date: 8/27/2023       History     Chief Complaint   Patient presents with    Laceration     Pt cut L 4th digit on kitchen knife at approx 1900. Pt bandaged and taped wound, states "It just wont stop bleeding". Denies any numbness/tingling. Pt able to move finger. UTD on tetanus      HPI  32 y.o.   RHD s/p knife wound accidental to LMF  Bandaged  Bleeding controlled    Review of patient's allergies indicates:  No Known Allergies  Past Medical History:   Diagnosis Date    GERD (gastroesophageal reflux disease)      Past Surgical History:   Procedure Laterality Date    ESOPHAGOGASTRODUODENOSCOPY N/A 5/11/2023    Procedure: EGD (ESOPHAGOGASTRODUODENOSCOPY);  Surgeon: Tyron Bruno MD;  Location: [facility];  Service: Endoscopy;  Laterality: N/A;     No family history on file.  Social History     Tobacco Use    Smoking status: Never    Smokeless tobacco: Never   Substance Use Topics    Alcohol use: Yes     Comment: socially    Drug use: Never     Review of Systems  All systems were reviewed/examined and were negative except as noted in the HPI.    Physical Exam     Initial Vitals [08/27/23 2229]   BP Pulse Resp Temp SpO2   (!) 140/95 86 19 98.6 °F (37 °C) 97 %      MAP       --         Physical Exam    General: the patient is awake, alert, and in no apparent distress.  Head: normocephalic and atraumatic, sclera are clear  Neck: supple without meningismus  Chest:  no respiratory distress  Heart: regular rate and rhythm  LMF 1cm laceration to distal volar tip, superficial    Hand nvi  Extremities: warm and well perfused  Skin: warm and dry  Psych conversant  Neuro: awake, alert, moving all extremities    ED Course   Lac Repair    Date/Time: 8/27/2023 10:40 PM    Performed by: Adama Mendoza MD  Authorized by: Adama Mendoza MD    Consent:     Consent obtained:  Verbal    Consent given by:  Patient  Universal protocol:     Patient identity confirmed:  Verbally with patient  Anesthesia:     " Anesthesia method:  None  Laceration details:     Location:  Finger    Finger location:  L long finger    Length (cm):  1  Treatment:     Area cleansed with:  Saline  Skin repair:     Repair method:  Tissue adhesive  Approximation:     Approximation:  Close  Repair type:     Repair type:  Simple  Post-procedure details:     Dressing:  Open (no dressing)    Procedure completion:  Tolerated    Labs Reviewed - No data to display       Imaging Results    None          Medications - No data to display  Medical Decision Making                 Medical Decision Making:    This is an emergent evaluation of a patient presenting to the ED.  Nursing notes were reviewed.    I decided to obtain and review old medical records, which showed: well care      Sergio Mendoza MD, [facility]              Clinical Impression:   Final diagnoses:  [S61.219A] Laceration of finger of left hand without foreign body without damage to nail, unspecified finger, initial encounter (Primary)        ED Disposition Condition    Discharge Stable          ED Prescriptions    None       Follow-up Information       Follow up With Specialties Details Why Contact Info    Your doctor  Schedule an appointment as soon as possible for a visit             Discharged to home in stable condition, return to ED warnings given, follow up and patient care instructions given.        Sergio Mendoza MD, [facility], [facility]  Department of Emergency Medicine       Adama Mendoza MD  08/30/23 0007

## 2023-10-03 ENCOUNTER — OFFICE VISIT (OUTPATIENT)
Dept: OTOLARYNGOLOGY | Facility: CLINIC | Age: 33
End: 2023-10-03
Payer: COMMERCIAL

## 2023-10-03 VITALS
DIASTOLIC BLOOD PRESSURE: 95 MMHG | HEART RATE: 75 BPM | BODY MASS INDEX: 28.52 KG/M2 | SYSTOLIC BLOOD PRESSURE: 135 MMHG | WEIGHT: 204.5 LBS

## 2023-10-03 DIAGNOSIS — G47.33 OSA (OBSTRUCTIVE SLEEP APNEA): Chronic | ICD-10-CM

## 2023-10-03 DIAGNOSIS — K21.9 LARYNGOPHARYNGEAL REFLUX (LPR): Primary | Chronic | ICD-10-CM

## 2023-10-03 DIAGNOSIS — J31.0 CHRONIC RHINITIS: Chronic | ICD-10-CM

## 2023-10-03 DIAGNOSIS — R09.A2 GLOBUS SENSATION: ICD-10-CM

## 2023-10-03 PROCEDURE — 99999 PR PBB SHADOW E&M-EST. PATIENT-LVL IV: ICD-10-PCS | Mod: PBBFAC,,, | Performed by: OTOLARYNGOLOGY

## 2023-10-03 PROCEDURE — 31575 DIAGNOSTIC LARYNGOSCOPY: CPT | Mod: PBBFAC,PN | Performed by: OTOLARYNGOLOGY

## 2023-10-03 PROCEDURE — 31575 LARYNGOSCOPY: ICD-10-PCS | Mod: S$GLB,,, | Performed by: OTOLARYNGOLOGY

## 2023-10-03 PROCEDURE — 99999 PR PBB SHADOW E&M-EST. PATIENT-LVL IV: CPT | Mod: PBBFAC,,, | Performed by: OTOLARYNGOLOGY

## 2023-10-03 PROCEDURE — 99214 OFFICE O/P EST MOD 30 MIN: CPT | Mod: 25,S$GLB,, | Performed by: OTOLARYNGOLOGY

## 2023-10-03 PROCEDURE — 99214 PR OFFICE/OUTPT VISIT, EST, LEVL IV, 30-39 MIN: ICD-10-PCS | Mod: 25,S$GLB,, | Performed by: OTOLARYNGOLOGY

## 2023-10-03 PROCEDURE — 99214 OFFICE O/P EST MOD 30 MIN: CPT | Mod: PBBFAC,25,PN | Performed by: OTOLARYNGOLOGY

## 2023-10-03 RX ORDER — PANTOPRAZOLE SODIUM 40 MG/1
40 TABLET, DELAYED RELEASE ORAL DAILY
Qty: 30 TABLET | Refills: 2 | Status: SHIPPED | OUTPATIENT
Start: 2023-10-03

## 2023-10-03 RX ORDER — LEVOCETIRIZINE DIHYDROCHLORIDE 5 MG/1
5 TABLET, FILM COATED ORAL NIGHTLY
Qty: 30 TABLET | Refills: 11 | Status: SHIPPED | OUTPATIENT
Start: 2023-10-03 | End: 2024-10-02

## 2023-10-03 RX ORDER — FAMOTIDINE 40 MG/1
40 TABLET, FILM COATED ORAL DAILY
Qty: 30 TABLET | Refills: 3 | Status: SHIPPED | OUTPATIENT
Start: 2023-10-03 | End: 2024-10-02

## 2023-10-03 NOTE — PROCEDURES
Laryngoscopy    Date/Time: 10/3/2023 2:20 PM    Performed by: Nancy Lees MD  Authorized by: Nancy Lees MD    Consent Done?:  Yes (Verbal)  Anesthesia:     Local anesthetic:  Lidocaine 2% and John-Synephrine 1/2%  Laryngoscopy:     Areas examined:  Nasal cavities, nasopharynx, oropharynx, hypopharynx, larynx and vocal cords  Nose External:      No external nasal deformity  Nose Intranasal:      Mucosa no polyps     Mucosa ulcers not present     No mucosa lesions present     No septum gross deformity     Turbinates not enlarged  Nasopharynx:      No mucosa lesions     Adenoids not present     Posterior choanae patent     Eustachian tube patent  Larynx/hypopharynx:      No epiglottis lesions     No epiglottis edema     No AE folds lesions     No vocal cord polyps     Equal and normal bilateral     No hypopharynx lesions     No piriform sinus pooling     No piriform sinus lesions     Post cricoid edema (mild)     Post cricoid erythema (mild)

## 2023-10-03 NOTE — PROGRESS NOTES
Chief Complaint   Patient presents with    Gastroesophageal Reflux    Cough   .     HPI:Johnny Heller is a 32 y.o. male who has been referred by Dr. Naveen Wayne for chronic cough. He reports history of chemical exposure 10/21. He has had multiple symptoms.  Was seen in ED 2/27/23 for dry cough, wheeze and subjective fever. Was treated with duoneb, dexamethasone. He notes that he also had nausea/vomitting at that time which he attributes to cough. He has intermittent feeling of throat swelling and feeling that it is closing up.  He was started on and ICS by company pulmonologist for some time. Unsure if he felt better using it. He has had PFTs which were normal.      Chronic fatigue over the past year. He does not think that he snores. Has been sleeping 7-8 hours a night, but still has trouble staying awake in the afternoons. Falls alseep if he is sitting despite not trying to. Has had to increase his caffeine intake to compensate. He has sleep study scheduled by pulmonology.      He denies history of hoarseness. Sometimes has hoarseness associated with yelling.  His voice is not progressively worsening over this time. There are pitch breaks or cracks. There is not vocal fatigue. He denies dysphagia, odynophagia, throat pain, and otalgia.  There is no hemoptysis or hematemesis. He is breathing well.     He admits to throat clearing and cough. He denies heartburn and reflux. Has noted increased episode of vomiting after eating with notable hematochezia, sometimes with clots.  He has seen GI with plans for EGD. He is on Protonix 40mg PO daily.   Notes that he has PND and is constantly clearing his throat and coughs up mucous frequently.       Interval HPI 10/3/2023:  Follow up visit. Last visit 5/23. Follow up LPR/cough. Reports that he has seen GI with EGD completed showing ringed esophagus in the lower 1/3.  GI recommended continuing present medications. Reports that he is still having nausea and is  having continued vomiting and had episode this morning.  Pathology from EGD was negative. Home sleep study 5/2023 showed moderate to severe ALEXIS. He has not had any follow up regarding this result.        Past Medical History:   Diagnosis Date    GERD (gastroesophageal reflux disease)      Social History     Socioeconomic History    Marital status: Single   Tobacco Use    Smoking status: Never    Smokeless tobacco: Never   Substance and Sexual Activity    Alcohol use: Yes     Comment: socially    Drug use: Never     Past Surgical History:   Procedure Laterality Date    ESOPHAGOGASTRODUODENOSCOPY N/A 5/11/2023    Procedure: EGD (ESOPHAGOGASTRODUODENOSCOPY);  Surgeon: Tyron Bruno MD;  Location: [facility];  Service: Endoscopy;  Laterality: N/A;     History reviewed. No pertinent family history.        Review of Systems  General: negative for chills, fever or weight loss  Psychological: negative for mood changes or depression  Ophthalmic: negative for blurry vision, photophobia or eye pain  ENT: see HPI  Respiratory: no cough, shortness of breath, or wheezing  Cardiovascular: no chest pain or dyspnea on exertion  Gastrointestinal: no abdominal pain, change in bowel habits, or black/ bloody stools  Musculoskeletal: negative for gait disturbance or muscular weakness  Neurological: no syncope or seizures; no ataxia  Dermatological: negative for puritis,  rash and jaundice  Hematologic/lymphatic: no easy bruising, no new lumps or bumps      Physical Exam:    Vitals:    10/03/23 1401   BP: (!) 135/95   Pulse: 75         Constitutional: Well appearing / communicating without difficutly.  NAD.  Eyes: EOM I Bilaterally  Head/Face: Normocephalic.  Negative paranasal sinus pressure/tenderness.  Salivary glands WNL.  House Brackmann I Bilaterally.    Right Ear: Auricle normal appearance. External Auditory Canal within normal limits no lesions or masses,TM w/o masses/lesions/perforations. TM mobility noted.   Left Ear:  Auricle normal appearance. External Auditory Canal within normal limits no lesions or masses,TM w/o masses/lesions/perforations. TM mobility noted.  Nose: +nasal septal deviation to the left.  Inferior Turbinates 3+ bilaterally. No septal perforation. No masses/lesions. External nasal skin appears normal without masses/lesions.  Oral Cavity: Gingiva/lips within normal limits.  Dentition/gingiva healthy appearing. Mucus membranes moist. Floor of mouth soft, no masses palpated. Oral Tongue mobile. Hard Palate appears normal.    Oropharynx: Base of tongue appears normal. No masses/lesions noted. Tonsillar fossa/pharyngeal wall without lesions. Posterior oropharynx WNL.  Soft palate without masses. Midline uvula.   Neck/Lymphatic: No LAD I-VI bilaterally.  No thyromegaly.  No masses noted on exam.    Mirror laryngoscopy/nasopharyngoscopy: Active gag reflex.  Unable to perform.    See separate procedure note for FFL.    Diagnostic studies reviewed:   PFT 4/26/2023: normal    Assessment:    ICD-10-CM ICD-9-CM    1. Laryngopharyngeal reflux (LPR)  K21.9 478.79 Ambulatory referral/consult to Gastroenterology      pantoprazole (PROTONIX) 40 MG tablet      2. ALEXIS (obstructive sleep apnea)  G47.33 327.23 Ambulatory referral/consult to Sleep Disorders      3. Globus sensation  R09.A2 784.99       4. Chronic rhinitis  J31.0 472.0           The primary encounter diagnosis was Laryngopharyngeal reflux (LPR). Diagnoses of ALEXIS (obstructive sleep apnea), Globus sensation, and Chronic rhinitis were also pertinent to this visit.      Plan:  Orders Placed This Encounter   Procedures    Ambulatory referral/consult to Gastroenterology    Ambulatory referral/consult to Sleep Disorders     Continue nasal saline rinses BID  Continue Flonase 2 sprays per nostril daily  Continue astelin 1 spray per nostril BID  Xyzal 5 mg PO daily    Protonix 40mg PO daily  Start famotidine 40mg PO daily  Referral to GI for vomiting/regurgitation   I also  recommended that the patient refrain from eating within 3 hours of going to bed, to elevate the head of bed very subtly and optimize the impact of gravity on the potential reflux, and to avoid alcohol, caffeine, tobacco, tomato sauce, spicy foods, fried food, and chocolate.   Referral to sleep medicine team for moderate to severe ALEXIS- needs CPAP initiation     Follow up in 4-6 months    Nancy Arce MD

## 2023-12-04 ENCOUNTER — OFFICE VISIT (OUTPATIENT)
Dept: GASTROENTEROLOGY | Facility: CLINIC | Age: 33
End: 2023-12-04
Payer: COMMERCIAL

## 2023-12-04 VITALS — BODY MASS INDEX: 29.79 KG/M2 | WEIGHT: 213.63 LBS

## 2023-12-04 DIAGNOSIS — K21.9 LARYNGOPHARYNGEAL REFLUX (LPR): Chronic | ICD-10-CM

## 2023-12-04 PROCEDURE — 1159F MED LIST DOCD IN RCRD: CPT | Mod: CPTII,S$GLB,,

## 2023-12-04 PROCEDURE — 3008F BODY MASS INDEX DOCD: CPT | Mod: CPTII,S$GLB,,

## 2023-12-04 PROCEDURE — 99999 PR PBB SHADOW E&M-EST. PATIENT-LVL III: CPT | Mod: PBBFAC,,,

## 2023-12-04 PROCEDURE — 99214 OFFICE O/P EST MOD 30 MIN: CPT | Mod: S$GLB,,,

## 2023-12-04 PROCEDURE — 1159F PR MEDICATION LIST DOCUMENTED IN MEDICAL RECORD: ICD-10-PCS | Mod: CPTII,S$GLB,,

## 2023-12-04 PROCEDURE — 99214 PR OFFICE/OUTPT VISIT, EST, LEVL IV, 30-39 MIN: ICD-10-PCS | Mod: S$GLB,,,

## 2023-12-04 PROCEDURE — 3008F PR BODY MASS INDEX (BMI) DOCUMENTED: ICD-10-PCS | Mod: CPTII,S$GLB,,

## 2023-12-04 PROCEDURE — 99999 PR PBB SHADOW E&M-EST. PATIENT-LVL III: ICD-10-PCS | Mod: PBBFAC,,,

## 2023-12-04 NOTE — PROGRESS NOTES
GASTROENTEROLOGY CLINIC NOTE    Reason for visit: The encounter diagnosis was Laryngopharyngeal reflux (LPR).  Referring provider/PCP: No, Primary Doctor    HPI:  Johnny Heller is a 33 y.o. male here today for f/u. Underwent EGD after last visit- no cause for hematemesis identified. Ringed esophagus noted, but bx negative for EoE. He reports continue daily pantoprazole. Vomiting has improved- maybe 1 per week. Seems as if food is sitting in lower esophagus and then regurgitated. Not forceful vomiting. No reflux related symptoms. Currently with cough, being tx for URI.     Initial Visit in May:  Johnny Heller is a 32 y.o. male here today for hematemesis, began about a year ago. He had exposure to benzene at work and symptoms started occurring after that. He vomits about 2-3x/week, he has some associated nausea beforehand. Vomitus contains food and bile, he also notices bright red blood clots. He sees the blood about once per week. He has seen pulmonology and seeing ENT next week due to benzene exposure. He denies NSAID use. He has tried pepcid OTC without relief. No acid relux related symptoms. Unsure if he has seen black stool or not, nothing recently. No abd pain.      Prior Endoscopy:  EGD: 2023 with Dr. Bruno:  - Esophageal mucosal changes suggestive of eosinophilic esophagitis.               - Normal stomach. Biopsied.               - Normal examined duodenum.               - Biopsies were taken with a cold forceps for evaluation of eosinophilic esophagitis.     Final Pathologic Diagnosis 1. Stomach, biopsy:  Antral and oxyntic mucosa with mild chronic inflammation.    No H.pylori identified on H&E stain slide.    No intestinal metaplasia or dysplasia.    2. Distal esophagus, biopsy:  Squamous epithelium with mild reflux type change.  No increased eosinophils.    3. Proximal esophagus, biopsy:  Squamous epithelium with no significant histopathologic abnormality.    No increased eosinophils.      Colon:    (Portions of this note were dictated using voice recognition software and may contain dictation related errors in spelling/grammar/syntax not found on text review)    Review of Systems   Respiratory:  Positive for cough.    Gastrointestinal:  Negative for heartburn.       Past Medical History: has a past medical history of GERD (gastroesophageal reflux disease).    Past Surgical History: has a past surgical history that includes Esophagogastroduodenoscopy (N/A, 5/11/2023).    Home medications:   Current Outpatient Medications on File Prior to Visit   Medication Sig Dispense Refill    albuterol (PROVENTIL/VENTOLIN HFA) 90 mcg/actuation inhaler Inhale 1-2 puffs into the lungs every 4 (four) hours as needed for Wheezing or Shortness of Breath. Rescue 18 g 0    azelastine (ASTELIN) 137 mcg (0.1 %) nasal spray 1 spray (137 mcg total) by Nasal route 2 (two) times daily. 30 mL 1    escitalopram oxalate (LEXAPRO) 5 MG Tab Take 5 mg by mouth once daily.      famotidine (PEPCID) 40 MG tablet Take 1 tablet (40 mg total) by mouth once daily. 30 tablet 3    fluticasone propionate (FLONASE) 50 mcg/actuation nasal spray 2 sprays (100 mcg total) by Each Nostril route once daily. 9.9 mL 11    levocetirizine (XYZAL) 5 MG tablet Take 1 tablet (5 mg total) by mouth every evening. 30 tablet 11    pantoprazole (PROTONIX) 40 MG tablet Take 1 tablet (40 mg total) by mouth once daily. 30 tablet 2     No current facility-administered medications on file prior to visit.       Vital signs:  Wt 96.9 kg (213 lb 10 oz)   BMI 29.79 kg/m²     Physical Exam  Constitutional:       Appearance: Normal appearance. He is normal weight.   Abdominal:      General: Abdomen is flat. There is no distension.   Neurological:      Mental Status: He is alert.       I have reviewed associated labs, imaging and notes.     Assessment:  1. Laryngopharyngeal reflux (LPR)    LPR   EGD in May-- ringed esophagus, bx negative for EoE   Started on PPI,  vomiting lessened  No GERD symptoms    Vomiting seems to be more related to regurgitation than forceful expulsion, has improved on PPI. EoE possible given pt was taking PPI at time of EGD-- improvement in symptoms with PPI. Consider taking break from PPI and completing EGD to formally r/o or dx EoE. Consider manometry.     Plan:     Continue taking pantoprazole as previously prescribed    F/u in 3 months    IMAN [facility] [place]

## 2023-12-04 NOTE — PATIENT INSTRUCTIONS
Continue daily pantoprazole    In a few months, take break from PPI   - can schedule EGD shortly afterwards to reevaluate for EoE

## 2023-12-17 ENCOUNTER — HOSPITAL ENCOUNTER (EMERGENCY)
Facility: HOSPITAL | Age: 33
Discharge: HOME | End: 2023-12-17
Attending: FAMILY MEDICINE
Payer: COMMERCIAL

## 2023-12-17 VITALS
BODY MASS INDEX: 29.29 KG/M2 | SYSTOLIC BLOOD PRESSURE: 150 MMHG | RESPIRATION RATE: 20 BRPM | DIASTOLIC BLOOD PRESSURE: 93 MMHG | TEMPERATURE: 99 F | HEART RATE: 103 BPM | OXYGEN SATURATION: 99 % | WEIGHT: 210 LBS

## 2023-12-17 DIAGNOSIS — S81.011A LACERATION OF RIGHT KNEE, INITIAL ENCOUNTER: Primary | ICD-10-CM

## 2023-12-17 DIAGNOSIS — T14.90XA INJURY: ICD-10-CM

## 2023-12-17 PROCEDURE — 99284 EMERGENCY DEPT VISIT MOD MDM: CPT | Mod: ER,25

## 2023-12-17 PROCEDURE — 25000003 PHARM REV CODE 250: Mod: ER | Performed by: FAMILY MEDICINE

## 2023-12-17 PROCEDURE — 12034 INTMD RPR S/TR/EXT 7.6-12.5: CPT | Mod: ER

## 2023-12-17 RX ORDER — CEPHALEXIN 500 MG/1
500 CAPSULE ORAL 4 TIMES DAILY
Qty: 40 CAPSULE | Refills: 0 | Status: SHIPPED | OUTPATIENT
Start: 2023-12-17 | End: 2023-12-27

## 2023-12-17 RX ORDER — CEPHALEXIN 500 MG/1
500 CAPSULE ORAL
Status: COMPLETED | OUTPATIENT
Start: 2023-12-17 | End: 2023-12-17

## 2023-12-17 RX ORDER — HYDROCODONE BITARTRATE AND ACETAMINOPHEN 5; 325 MG/1; MG/1
1 TABLET ORAL EVERY 6 HOURS PRN
Qty: 12 TABLET | Refills: 0 | Status: SHIPPED | OUTPATIENT
Start: 2023-12-17

## 2023-12-17 RX ORDER — LIDOCAINE HYDROCHLORIDE 10 MG/ML
10 INJECTION, SOLUTION EPIDURAL; INFILTRATION; INTRACAUDAL; PERINEURAL
Status: COMPLETED | OUTPATIENT
Start: 2023-12-17 | End: 2023-12-17

## 2023-12-17 RX ORDER — IBUPROFEN 400 MG/1
800 TABLET ORAL
Status: COMPLETED | OUTPATIENT
Start: 2023-12-17 | End: 2023-12-17

## 2023-12-17 RX ORDER — IBUPROFEN 800 MG/1
800 TABLET ORAL EVERY 6 HOURS PRN
Qty: 20 TABLET | Refills: 0 | Status: SHIPPED | OUTPATIENT
Start: 2023-12-17

## 2023-12-17 RX ADMIN — LIDOCAINE HYDROCHLORIDE 100 MG: 10 INJECTION, SOLUTION EPIDURAL; INFILTRATION; INTRACAUDAL at 04:12

## 2023-12-17 RX ADMIN — CEPHALEXIN 500 MG: 500 CAPSULE ORAL at 04:12

## 2023-12-17 RX ADMIN — IBUPROFEN 800 MG: 400 TABLET ORAL at 04:12

## 2023-12-17 NOTE — Clinical Note
"Johnny Melgar" Arron was seen and treated in our emergency department on 12/17/2023.  He may return to work on 12/23/2023.       If you have any questions or concerns, please don't hesitate to call.      Quinn Galicia MD"

## 2023-12-18 NOTE — ED NOTES
Report received from YAA Disla.  Assumed care of this patient.   Waiting on MD to discharge patient.

## 2023-12-18 NOTE — ED PROVIDER NOTES
Encounter Date: 12/17/2023       History     Chief Complaint   Patient presents with    Fall     Reports fell while leaving [employer] game. Large V shaped laceration noted to R knee. Tetanus UTD     33-year-old male was walking home after leaning [place] game today and fell on side of the curb and sustained laceration to his right knee.  Patient able to walk and bear weight with no difficulty.  He noticed a very large laceration and presents to ED for further evaluation.  He denies loss of consciousness.  No head injury.  No injuries any other part of the body.    The history is provided by the patient.     Review of patient's allergies indicates:  No Known Allergies  Past Medical History:   Diagnosis Date    GERD (gastroesophageal reflux disease)      Past Surgical History:   Procedure Laterality Date    ESOPHAGOGASTRODUODENOSCOPY N/A 5/11/2023    Procedure: EGD (ESOPHAGOGASTRODUODENOSCOPY);  Surgeon: Tyron Bruno MD;  [facility];  Service: Endoscopy;  Laterality: N/A;     History reviewed. No pertinent family history.  Social History     Tobacco Use    Smoking status: Never    Smokeless tobacco: Never   Substance Use Topics    Alcohol use: Yes     Comment: socially    Drug use: Never     Review of Systems   Constitutional:  Negative for fever.   HENT:  Negative for sore throat.    Respiratory:  Negative for shortness of breath.    Cardiovascular:  Negative for chest pain.   Gastrointestinal:  Negative for nausea.   Genitourinary:  Negative for dysuria.   Musculoskeletal:  Negative for back pain.   Skin:  Negative for rash.   Neurological:  Negative for weakness.   Hematological:  Does not bruise/bleed easily.   All other systems reviewed and are negative.      Physical Exam     Initial Vitals   BP Pulse Resp Temp SpO2   12/17/23 1602 12/17/23 1602 12/17/23 1602 12/17/23 1602 12/17/23 1603   (!) 150/93 103 20 98.5 °F (36.9 °C) 99 %      MAP       --                Physical Exam    Nursing note and  vitals reviewed.  Constitutional: Vital signs are normal. He appears well-developed and well-nourished. He is active. No distress.   HENT:   Head: Normocephalic.   Nose: Nose normal.   Mouth/Throat: Oropharynx is clear and moist and mucous membranes are normal.   Eyes: Conjunctivae, EOM and lids are normal.   Neck: Neck supple.   Normal range of motion.  Cardiovascular:  Normal rate, regular rhythm, S1 normal, S2 normal and normal heart sounds.           Pulmonary/Chest: Breath sounds normal. No respiratory distress. He has no wheezes. He has no rhonchi. He has no rales.   Abdominal: Abdomen is soft. Bowel sounds are normal. He exhibits no distension. There is no abdominal tenderness. There is no rebound.   Musculoskeletal:         General: Normal range of motion.      Right upper arm: Normal.      Left upper arm: Normal.      Cervical back: Normal range of motion and neck supple.      Right lower leg: Normal.      Left lower leg: Normal.        Legs:       Comments: V-shaped large avulsion laceration right knee below patella and just above tibial tuberosity.  Exploration of the wound with superficial fascia covering inferior patellar tendon involved.  No displacement or subluxation of patella.  Normal knee joint movements.  Normal flexion and extension of right lower leg.  Normal sensation of right lower leg.  Joint space not exposed.     Neurological: He is alert and oriented to person, place, and time. He has normal strength. No cranial nerve deficit or sensory deficit. GCS score is 15. GCS eye subscore is 4. GCS verbal subscore is 5. GCS motor subscore is 6.   Skin: Skin is warm. Capillary refill takes less than 2 seconds.   Psychiatric: He has a normal mood and affect. His speech is normal and behavior is normal. Thought content normal. Cognition and memory are normal.         ED Course   Lac Repair    Date/Time: 12/17/2023 7:04 PM    Performed by: Quinn Galicia MD  Authorized by: Quinn Galicia MD     Consent:     Consent obtained:  Verbal    Consent given by:  Patient    Risks, benefits, and alternatives were discussed: yes      Risks discussed:  Infection, pain and tendon damage  Universal protocol:     Procedure explained and questions answered to patient or proxy's satisfaction: yes      Relevant documents present and verified: yes      Test results available: yes      Imaging studies available: yes      Required blood products, implants, devices, and special equipment available: yes      Site/side marked: yes      Immediately prior to procedure, a time out was called: yes      Patient identity confirmed:  Verbally with patient  Anesthesia:     Anesthesia method:  Local infiltration    Local anesthetic:  Lidocaine 1% w/o epi  Laceration details:     Location:  Leg    Leg location:  R knee    Length (cm):  10    Depth (mm):  15  Exploration:     Hemostasis achieved with:  Direct pressure    Imaging obtained: x-ray      Imaging outcome: foreign body not noted      Wound exploration: wound explored through full range of motion and entire depth of wound visualized      Wound extent: fascia violated      Contaminated: no    Treatment:     Area cleansed with:  Povidone-iodine and saline    Amount of cleaning:  Standard    Irrigation solution:  Sterile water    Irrigation volume:  500    Irrigation method:  Syringe    Visualized foreign bodies/material removed: no      Debridement:  None    Undermining:  Extensive    Layers/structures repaired:  Deep dermal/superficial fascia and deep subcutaneous  Deep subcutaneous:     Suture size:  3-0    Suture material:  Vicryl    Suture technique:  Running and vertical mattress    Number of sutures:  4  Deep dermal/superficial fascia:     Suture size:  3-0    Suture material:  Vicryl    Suture technique:  Horizontal mattress    Number of sutures:  2  Skin repair:     Repair method:  Sutures    Suture size:  3-0    Suture material:  Prolene    Suture technique:  Simple  interrupted    Number of sutures:  17  Approximation:     Approximation:  Close  Repair type:     Repair type:  Complex  Post-procedure details:     Dressing:  Sterile dressing and non-adherent dressing    Procedure completion:  Tolerated well, no immediate complications    Labs Reviewed - No data to display       Imaging Results              X-Ray Knee 1 or 2 View Right (Final result)  Result time 12/17/23 17:15:20   Procedure changed from X-Ray Knee 1 or 2 View Left     Final result by Nicky Cantrell MD (12/17/23 17:15:20)                   Impression:      Soft tissue laceration suspected with overlying bandage artifact.  No overt fracture or joint malalignment seen on limited two view exam.      Electronically signed by: Nicky Cantrell  Date:    12/17/2023  Time:    17:15               Narrative:    EXAMINATION:  XR KNEE 1 OR 2 VIEW RIGHT    CLINICAL HISTORY:  Injury, unspecified, initial encounterpain;    COMPARISON:  None available                                       Medications   LIDOcaine (PF) 10 mg/ml (1%) injection 100 mg (100 mg Infiltration Given 12/17/23 1648)   ibuprofen tablet 800 mg (800 mg Oral Given 12/17/23 1647)   cephALEXin capsule 500 mg (500 mg Oral Given 12/17/23 1648)     Medical Decision Making   Differential diagnosis include laceration, tendon injury, fracture, open fracture, muscle injury, contusion, soft tissue injury, contamination, infection     Large laceration to right below patellar area.  Undermined with mild contamination on the edges.  Patellar tendon seems to be intact only superficial fissure involved.  Wound irrigated well, and laceration sutured.  Patient is placed in knee immobilizer.  Antibiotics and painMedication.  Orthopedics referral.  X-ray of right knee with soft tissue injury.  No fracture or dislocation.    Amount and/or Complexity of Data Reviewed  Radiology: ordered and independent interpretation performed. Decision-making details documented in ED  Course.     Details: X-ray of right knee with soft tissue injury.  No fracture or dislocation.    Risk  Prescription drug management.                                      Clinical Impression:  Final diagnoses:  [T14.90XA] Injury  [S81.011A] Laceration of right knee, initial encounter (Primary)          ED Disposition Condition    Discharge Stable          ED Prescriptions       Medication Sig Dispense Start Date End Date Auth. Provider    cephALEXin (KEFLEX) 500 MG capsule Take 1 capsule (500 mg total) by mouth 4 (four) times daily. for 10 days 40 capsule 12/17/2023 12/27/2023 Quinn Galicia MD    ibuprofen (ADVIL,MOTRIN) 800 MG tablet Take 1 tablet (800 mg total) by mouth every 6 (six) hours as needed for Pain. 20 tablet 12/17/2023 -- Quinn Galicia MD    HYDROcodone-acetaminophen (NORCO) 5-325 mg per tablet Take 1 tablet by mouth every 6 (six) hours as needed for Pain. 12 tablet 12/17/2023 -- Quinn Galicia MD          Follow-up Information    None          Quinn Galicia MD  12/19/23 2442